# Patient Record
Sex: MALE | Race: WHITE | NOT HISPANIC OR LATINO | Employment: FULL TIME | URBAN - METROPOLITAN AREA
[De-identification: names, ages, dates, MRNs, and addresses within clinical notes are randomized per-mention and may not be internally consistent; named-entity substitution may affect disease eponyms.]

---

## 2017-11-16 ENCOUNTER — ALLSCRIPTS OFFICE VISIT (OUTPATIENT)
Dept: OTHER | Facility: OTHER | Age: 47
End: 2017-11-16

## 2017-11-16 ENCOUNTER — TRANSCRIBE ORDERS (OUTPATIENT)
Dept: LAB | Facility: CLINIC | Age: 47
End: 2017-11-16

## 2017-11-16 ENCOUNTER — GENERIC CONVERSION - ENCOUNTER (OUTPATIENT)
Dept: OTHER | Facility: OTHER | Age: 47
End: 2017-11-16

## 2017-11-16 ENCOUNTER — APPOINTMENT (OUTPATIENT)
Dept: LAB | Facility: CLINIC | Age: 47
End: 2017-11-16
Payer: COMMERCIAL

## 2017-11-16 DIAGNOSIS — Z00.00 ENCOUNTER FOR GENERAL ADULT MEDICAL EXAMINATION WITHOUT ABNORMAL FINDINGS: ICD-10-CM

## 2017-11-16 DIAGNOSIS — R19.7 POSTCHOLECYSTECTOMY DIARRHEA: ICD-10-CM

## 2017-11-16 DIAGNOSIS — G47.00 INSOMNIA: ICD-10-CM

## 2017-11-16 DIAGNOSIS — F41.9 ANXIETY DISORDER: ICD-10-CM

## 2017-11-16 DIAGNOSIS — J45.909 ASTHMATIC BRONCHITIS WITHOUT COMPLICATION, UNSPECIFIED ASTHMA SEVERITY, UNSPECIFIED WHETHER PERSISTENT: ICD-10-CM

## 2017-11-16 DIAGNOSIS — F45.8 ANXIETY HYPERVENTILATION: Primary | ICD-10-CM

## 2017-11-16 DIAGNOSIS — Z90.49 ACQUIRED ABSENCE OF OTHER SPECIFIED PARTS OF DIGESTIVE TRACT: ICD-10-CM

## 2017-11-16 DIAGNOSIS — E78.5 HYPERLIPIDEMIA: ICD-10-CM

## 2017-11-16 DIAGNOSIS — R80.9 PROTEINURIA: ICD-10-CM

## 2017-11-16 DIAGNOSIS — J45.909 UNCOMPLICATED ASTHMA: ICD-10-CM

## 2017-11-16 DIAGNOSIS — G47.00 PERSISTENT DISORDER OF INITIATING OR MAINTAINING SLEEP: ICD-10-CM

## 2017-11-16 DIAGNOSIS — R80.9 PROTEINURIA, UNSPECIFIED TYPE: ICD-10-CM

## 2017-11-16 DIAGNOSIS — E78.5 HYPERLIPIDEMIA, UNSPECIFIED HYPERLIPIDEMIA TYPE: ICD-10-CM

## 2017-11-16 DIAGNOSIS — Z90.49 POSTCHOLECYSTECTOMY DIARRHEA: ICD-10-CM

## 2017-11-16 DIAGNOSIS — F41.9 ANXIETY HYPERVENTILATION: Primary | ICD-10-CM

## 2017-11-16 LAB
ALBUMIN SERPL BCP-MCNC: 4.2 G/DL (ref 3.5–5)
ALP SERPL-CCNC: 64 U/L (ref 46–116)
ALT SERPL W P-5'-P-CCNC: 30 U/L (ref 12–78)
ANION GAP SERPL CALCULATED.3IONS-SCNC: 6 MMOL/L (ref 4–13)
AST SERPL W P-5'-P-CCNC: 21 U/L (ref 5–45)
BASOPHILS # BLD AUTO: 0.03 THOUSANDS/ΜL (ref 0–0.1)
BASOPHILS NFR BLD AUTO: 1 % (ref 0–1)
BILIRUB SERPL-MCNC: 0.78 MG/DL (ref 0.2–1)
BILIRUB UR QL STRIP: NEGATIVE
BUN SERPL-MCNC: 17 MG/DL (ref 5–25)
CALCIUM SERPL-MCNC: 9 MG/DL (ref 8.3–10.1)
CHLORIDE SERPL-SCNC: 105 MMOL/L (ref 100–108)
CHOLEST SERPL-MCNC: 185 MG/DL (ref 50–200)
CLARITY UR: CLEAR
CO2 SERPL-SCNC: 28 MMOL/L (ref 21–32)
COLOR UR: YELLOW
CREAT SERPL-MCNC: 1 MG/DL (ref 0.6–1.3)
EOSINOPHIL # BLD AUTO: 0.28 THOUSAND/ΜL (ref 0–0.61)
EOSINOPHIL NFR BLD AUTO: 5 % (ref 0–6)
ERYTHROCYTE [DISTWIDTH] IN BLOOD BY AUTOMATED COUNT: 12.3 % (ref 11.6–15.1)
EST. AVERAGE GLUCOSE BLD GHB EST-MCNC: 105 MG/DL
GFR SERPL CREATININE-BSD FRML MDRD: 89 ML/MIN/1.73SQ M
GLUCOSE P FAST SERPL-MCNC: 94 MG/DL (ref 65–99)
GLUCOSE UR STRIP-MCNC: NEGATIVE MG/DL
HBA1C MFR BLD: 5.3 % (ref 4.2–6.3)
HCT VFR BLD AUTO: 42 % (ref 36.5–49.3)
HDLC SERPL-MCNC: 54 MG/DL (ref 40–60)
HGB BLD-MCNC: 14.5 G/DL (ref 12–17)
HGB UR QL STRIP.AUTO: NEGATIVE
KETONES UR STRIP-MCNC: NEGATIVE MG/DL
LDLC SERPL CALC-MCNC: 108 MG/DL (ref 0–100)
LEUKOCYTE ESTERASE UR QL STRIP: NEGATIVE
LYMPHOCYTES # BLD AUTO: 1.94 THOUSANDS/ΜL (ref 0.6–4.47)
LYMPHOCYTES NFR BLD AUTO: 32 % (ref 14–44)
MCH RBC QN AUTO: 29.7 PG (ref 26.8–34.3)
MCHC RBC AUTO-ENTMCNC: 34.5 G/DL (ref 31.4–37.4)
MCV RBC AUTO: 86 FL (ref 82–98)
MONOCYTES # BLD AUTO: 0.53 THOUSAND/ΜL (ref 0.17–1.22)
MONOCYTES NFR BLD AUTO: 9 % (ref 4–12)
NEUTROPHILS # BLD AUTO: 3.2 THOUSANDS/ΜL (ref 1.85–7.62)
NEUTS SEG NFR BLD AUTO: 53 % (ref 43–75)
NITRITE UR QL STRIP: NEGATIVE
NRBC BLD AUTO-RTO: 0 /100 WBCS
PH UR STRIP.AUTO: 6 [PH] (ref 4.5–8)
PLATELET # BLD AUTO: 205 THOUSANDS/UL (ref 149–390)
PMV BLD AUTO: 10.5 FL (ref 8.9–12.7)
POTASSIUM SERPL-SCNC: 4.2 MMOL/L (ref 3.5–5.3)
PROT SERPL-MCNC: 7.6 G/DL (ref 6.4–8.2)
PROT UR STRIP-MCNC: NEGATIVE MG/DL
RBC # BLD AUTO: 4.88 MILLION/UL (ref 3.88–5.62)
SODIUM SERPL-SCNC: 139 MMOL/L (ref 136–145)
SP GR UR STRIP.AUTO: 1.02 (ref 1–1.03)
TRIGL SERPL-MCNC: 116 MG/DL
TSH SERPL DL<=0.05 MIU/L-ACNC: 2.23 UIU/ML (ref 0.36–3.74)
UROBILINOGEN UR QL STRIP.AUTO: 0.2 E.U./DL
WBC # BLD AUTO: 6 THOUSAND/UL (ref 4.31–10.16)

## 2017-11-16 PROCEDURE — 36415 COLL VENOUS BLD VENIPUNCTURE: CPT

## 2017-11-16 PROCEDURE — 83036 HEMOGLOBIN GLYCOSYLATED A1C: CPT

## 2017-11-16 PROCEDURE — 87086 URINE CULTURE/COLONY COUNT: CPT

## 2017-11-16 PROCEDURE — 81003 URINALYSIS AUTO W/O SCOPE: CPT

## 2017-11-16 PROCEDURE — 80053 COMPREHEN METABOLIC PANEL: CPT

## 2017-11-16 PROCEDURE — 84443 ASSAY THYROID STIM HORMONE: CPT

## 2017-11-16 PROCEDURE — 85025 COMPLETE CBC W/AUTO DIFF WBC: CPT

## 2017-11-16 PROCEDURE — 80061 LIPID PANEL: CPT

## 2017-11-17 LAB — BACTERIA UR CULT: NORMAL

## 2017-11-17 NOTE — PROGRESS NOTES
Assessment    1  Asthma (493 90) (J45 909)   2  Anxiety (300 00) (F41 9)   3  S/P colon resection (V45 89) (Z90 49)   4  Eczema (692 9) (L30 9)   5  Hyperlipidemia (272 4) (E78 5)    Plan  Anxiety, Asthma, Health Maintenance, PMH: History of insomnia, Hyperlipidemia,Proteinuria, S/P colon resection    · (1) CBC/PLT/DIFF; Status:Active; Requested for:16Nov2017;    · (1) COMPREHENSIVE METABOLIC PANEL; Status:Active; Requested for:16Nov2017;    · (1) HEMOGLOBIN A1C; Status:Active; Requested for:16Nov2017;    · (1) LIPID PANEL, FASTING; Status:Active; Requested for:16Nov2017;    · (1) TSH; Status:Active; Requested for:16Nov2017;    · (1) URINALYSIS (will reflex a microscopy if leukocytes, occult blood, protein or nitrites arenot within normal limits); Status:Active; Requested for:16Nov2017;    · (1) URINE CULTURE; Source:Urine, Clean Catch; Status:Active; Requestedfor:16Nov2017; Asthma    · Advair Diskus 250-50 MCG/DOSE Inhalation Aerosol Powder Breath Activated;USE 1 INHALATION TWO TIMES A DAY   · ProAir  (90 Base) MCG/ACT Inhalation Aerosol Solution; INHALE 1 PUFFEVERY 4 HOURS AS NEEDED  Eczema    · Mometasone Furoate 0 1 % External Cream; APPLY SPARINGLY TO AFFECTEDAREAS TWICE DAILY  (AM AND PM)  Health Maintenance    · Stop: Fluzone Quadrivalent 0 5 ML Intramuscular Suspension PrefilledSyringe    Discussion/Summary    #1  Status post colon resection for a flat polyp  The surgery was done by Dr Joy Marquez  Eczema-patient was given a prescription for Elocon cream to apply twice a day when necessary  Asthma-stable with Advair and ProAir  Anxiety-stable  Hyperlipidemia-labs are ordered today and patient will be called with those results  to keep his asthma under control and keep his cholesterol numbers down  patient with results of fasting labs which will be drawn today  Possible side effects of new medications were reviewed with the patient/guardian today  The treatment plan was reviewed with the patient/guardian   The patient/guardian understands and agrees with the treatment plan     Self Referrals: No      Chief Complaint  Follow up anxiety, asthma, hyperlipidemiaimmun[facility] declined - [facility]      History of Present Illness  This is a 50-year-old male who comes in for his yearly follow-up  His asthma is stable using Advair twice a day when he needs it and also uses ProAir also as he needs it  He had a colon resection due to a flat polyp which had to be removed by doing a colon resection and he has recovered nicely from that  His cholesterol numbers will be checked with his labs which will be done today because he is fasting  He is on simvastatin 40 mg daily  He also gets some periods of lightheadedness in between meals and his father had some problems with some diabetes in the past  His last to fasting blood glucoses were both 98  Review of Systems   Constitutional: No fever or chills, feels well, no tiredness, no recent weight gain or weight loss  ENT: no complaints of earache, no hearing loss, no nosebleeds, no nasal discharge, no sore throat, no hoarseness  Cardiovascular: No complaints of slow heart rate, no fast heart rate, no chest pain, no palpitations, no leg claudication, no lower extremity  Respiratory: No complaints of shortness of breath, no wheezing, no cough, no SOB on exertion, no orthopnea or PND  Gastrointestinal: No complaints of abdominal pain, no constipation, no nausea or vomiting, no diarrhea or bloody stools  Genitourinary: No complaints of dysuria, no incontinence, no hesitancy, no nocturia, no genital lesion, no testicular pain  ROS reviewed  Active Problems  1  Anxiety (300 00) (F41 9)   2  Asthma (493 90) (J45 909)   3  Colonoscopy (Fiberoptic) Screening   4  Encounter for prostate cancer screening (V76 44) (Z12 5)   5  Hyperlipidemia (272 4) (E78 5)   6  Proteinuria (791 0) (R80 9)   7  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)    Past Medical History  1   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)    The active problems and past medical history were reviewed and updated today  Surgical History  1  History of Primary Repair Of Knee Ligament Cruciate Anterior    The surgical history was reviewed and updated today  Family History  Mother    1  Family history of Coronary Artery Disease (V17 49)  Father    2  Family history of Acute Myocardial Infarction (V17 3)   3  Family history of Hyperlipidemia   4  Family history of Hypertension (V17 49)  Family History    5  Denied: Family history of Anxiety and depression   6  Family history of Family Health Status Of Brother - Alive   7  Family history of Family Health Status Of Father - Alive   8  Family history of Family Health Status Of Mother -     The family history was reviewed and updated today  Social History     · Being A Social Drinker   · Employed   · Never a smoker   · No secondhand smoke exposure (V49 89) (Z78 9)   · Single  The social history was reviewed and updated today  The social history was reviewed and is unchanged  Current Meds   1  Advair Diskus 250-50 MCG/DOSE Inhalation Aerosol Powder Breath Activated; INHALE 1 PUFF TWICE DAILY  RINSE MOUTH AFTER USE; Therapy: 46WTY0588 to (Last BI:[id number])  Requested for: 17VXW6964 Ordered   2  ProAir  (90 Base) MCG/ACT Inhalation Aerosol Solution; INHALE 1 PUFF EVERY 4 HOURS AS NEEDED; Therapy: 18AMV1843 to ([facility])  Requested for: 17MGE2912; Last Rx:[id number] Ordered   3  Simvastatin 40 MG Oral Tablet; Take 1 tablet daily; Therapy: 98MJL0774 to (Last Rx:57Lph0957)  Requested for: 25Phj5322 Ordered    The medication list was reviewed and updated today  Allergies  1   No Known Drug Allergies    Vitals  Vital Signs    Recorded: 42UMF0610 09:30AM   Heart Rate 62, L Radial   Pulse Quality Regular, L Radial   Systolic 96, LUE, Sitting   Diastolic 70, LUE, Sitting   BP CUFF SIZE Large   Height 5 ft 7 in   Weight 190 lb    BMI Calculated 29 76 BSA Calculated 1 98       Physical Exam   Constitutional  General appearance: No acute distress, well appearing and well nourished  Ears, Nose, Mouth, and Throat  External inspection of ears and nose: Normal    Otoscopic examination: Tympanic membrance translucent with normal light reflex  Canals patent without erythema  Oropharynx: Normal with no erythema, edema, exudate or lesions  Pulmonary  Auscultation of lungs: Clear to auscultation, equal breath sounds bilaterally, no wheezes, no rales, no rhonci  Cardiovascular  Auscultation of heart: Normal rate and rhythm, normal S1 and S2, without murmurs  Examination of extremities for edema and/or varicosities: Normal    Abdomen  Abdomen: Non-tender, no masses  Liver and spleen: No hepatomegaly or splenomegaly  Lymphatic  Palpation of lymph nodes in neck: No lymphadenopathy  Psychiatric  Orientation to person, place and time: Normal    Mood and affect: Normal          Results/Data  PHQ-9 Adult Depression Screening 50RYV2307 09:40AM User, Zonbo Medias     Test Name Result Flag Reference   PHQ-9 Adult Depression Score 5       Over the last two weeks, how often have you been bothered by any of the following problems? Little interest or pleasure in doing things: Not at all - 0 Feeling down, depressed, or hopeless: Not at all - 0 Trouble falling or staying asleep, or sleeping too much: More than half the days - 2 Feeling tired or having little energy: Several days - 1 Poor appetite or over eating: More than half the days - 2 Feeling bad about yourself - or that you are a failure or have let yourself or your family down: Not at all - 0 Trouble concentrating on things, such as reading the newspaper or watching television: Not at all - 0 Moving or speaking so slowly that other people could have noticed   Or the opposite -  being so fidgety or restless that you have been moving around a lot more than usual: Not at all - 0 Thoughts that you would be better off dead, or of hurting yourself in some way: Not at all - 0   PHQ-9 Adult Depression Screening Negative     PHQ-9 Difficulty Level Not difficult at all     PHQ-9 Severity Mild Depression           Signatures   Electronically signed by : Winston Younger [facility]; Nov 16 2017 10:07AM EST                       (Author)    Electronically signed by : Lisa Garcia DO; Nov 16 2017 10:25AM EST                       (Author)

## 2017-11-19 ENCOUNTER — GENERIC CONVERSION - ENCOUNTER (OUTPATIENT)
Dept: OTHER | Facility: OTHER | Age: 47
End: 2017-11-19

## 2017-12-29 ENCOUNTER — GENERIC CONVERSION - ENCOUNTER (OUTPATIENT)
Dept: FAMILY MEDICINE CLINIC | Facility: CLINIC | Age: 47
End: 2017-12-29

## 2018-01-11 NOTE — RESULT NOTES
Verified Results  (1) URINALYSIS w URINE C/S REFLEX (will reflex a microscopy if leukocytes, occult blood, or nitrites are not within normal limits) 21Nov2016 08:20AM Beth Sandoval Order Number: [id number]     Test Name Result Flag Reference   CLINICAL REPORT (Report)     Test:        Urine culture  Specimen Type:   Urine  Specimen Date:   11/21/2016 8:20 AM  Result Date:    11/22/2016 12:48 PM  Result Status:   Final result  Resulting Lab:   [place] [address]            Tel: [phone]      CULTURE                                       ------------------                                   No Growth <1000 cfu/mL

## 2018-01-13 VITALS
HEIGHT: 67 IN | DIASTOLIC BLOOD PRESSURE: 70 MMHG | SYSTOLIC BLOOD PRESSURE: 96 MMHG | WEIGHT: 190 LBS | BODY MASS INDEX: 29.82 KG/M2 | HEART RATE: 62 BPM

## 2018-01-16 NOTE — RESULT NOTES
Verified Results  (1) URINALYSIS w URINE C/S REFLEX (will reflex a microscopy if leukocytes, occult blood, or nitrites are not within normal limits) 21Nov2016 08:20AM Deb Car Order Number: [id number]     Test Name Result Flag Reference   COLOR Yellow     CLARITY Clear     PH UA 5 5  4 5-8 0   LEUKOCYTE ESTERASE UA Negative  Negative   NITRITE UA Negative  Negative   PROTEIN UA Trace mg/dl A Negative   GLUCOSE UA Negative mg/dl  Negative   KETONES UA Negative mg/dl  Negative   UROBILINOGEN UA 0 2 E U /dl  0 2, 1 0 E U /dl   BILIRUBIN UA Negative  Negative   BLOOD UA Trace A Negative   SPECIFIC GRAVITY UA 1 027  1 003-1 030   BACTERIA None Seen /hpf  None Seen, Occasional   EPITHELIAL CELLS None Seen /hpf  None Seen, Occasional   RBC UA 4-10 /hpf A None Seen   WBC UA None Seen /hpf  None Seen

## 2018-02-13 NOTE — RESULT NOTES
Verified Results  (1) HEMOGLOBIN A1C [id number] 10:15AM Santy Armstrong     Test Name Result Flag Reference   HEMOGLOBIN A1C 5 3 %  4 2-6 3   EST  AVG   GLUCOSE 105 mg/dl

## 2018-05-25 DIAGNOSIS — E78.2 MIXED HYPERLIPIDEMIA: Primary | ICD-10-CM

## 2018-05-25 RX ORDER — SIMVASTATIN 40 MG
TABLET ORAL
Qty: 90 TABLET | Refills: 2 | Status: SHIPPED | OUTPATIENT
Start: 2018-05-25 | End: 2019-03-03 | Stop reason: SDUPTHER

## 2019-03-03 DIAGNOSIS — E78.2 MIXED HYPERLIPIDEMIA: ICD-10-CM

## 2019-03-04 RX ORDER — SIMVASTATIN 40 MG
TABLET ORAL
Qty: 90 TABLET | Refills: 0 | Status: SHIPPED | OUTPATIENT
Start: 2019-03-04 | End: 2019-05-13 | Stop reason: SDUPTHER

## 2019-03-04 NOTE — TELEPHONE ENCOUNTER
The patient may have 90 tablets but has not been seen since November of 2017 and needs an appointment    I will send 30 tablets

## 2019-05-13 DIAGNOSIS — E78.2 MIXED HYPERLIPIDEMIA: ICD-10-CM

## 2019-05-13 RX ORDER — SIMVASTATIN 40 MG
40 TABLET ORAL DAILY
Qty: 90 TABLET | Refills: 0 | Status: SHIPPED | OUTPATIENT
Start: 2019-05-13 | End: 2019-08-28 | Stop reason: SDUPTHER

## 2019-05-13 RX ORDER — SIMVASTATIN 40 MG
TABLET ORAL
Qty: 90 TABLET | Refills: 0 | OUTPATIENT
Start: 2019-05-13

## 2019-08-28 DIAGNOSIS — E78.2 MIXED HYPERLIPIDEMIA: ICD-10-CM

## 2019-08-28 RX ORDER — SIMVASTATIN 40 MG
TABLET ORAL
Qty: 30 TABLET | Refills: 0 | Status: SHIPPED | OUTPATIENT
Start: 2019-08-28 | End: 2019-11-13 | Stop reason: SDUPTHER

## 2019-11-13 ENCOUNTER — OFFICE VISIT (OUTPATIENT)
Dept: FAMILY MEDICINE CLINIC | Facility: CLINIC | Age: 49
End: 2019-11-13
Payer: COMMERCIAL

## 2019-11-13 ENCOUNTER — APPOINTMENT (OUTPATIENT)
Dept: LAB | Facility: CLINIC | Age: 49
End: 2019-11-13
Payer: COMMERCIAL

## 2019-11-13 VITALS
BODY MASS INDEX: 29.98 KG/M2 | HEART RATE: 64 BPM | HEIGHT: 67 IN | SYSTOLIC BLOOD PRESSURE: 118 MMHG | WEIGHT: 191 LBS | DIASTOLIC BLOOD PRESSURE: 80 MMHG

## 2019-11-13 DIAGNOSIS — F41.9 ANXIETY: ICD-10-CM

## 2019-11-13 DIAGNOSIS — J45.990 EXERCISE-INDUCED ASTHMA: ICD-10-CM

## 2019-11-13 DIAGNOSIS — E78.2 MIXED HYPERLIPIDEMIA: ICD-10-CM

## 2019-11-13 DIAGNOSIS — J45.990 EXERCISE-INDUCED ASTHMA: Primary | ICD-10-CM

## 2019-11-13 DIAGNOSIS — Z12.5 SCREENING FOR PROSTATE CANCER: ICD-10-CM

## 2019-11-13 PROBLEM — M25.569 KNEE PAIN: Status: ACTIVE | Noted: 2019-11-13

## 2019-11-13 PROBLEM — M22.40 CHONDROMALACIA PATELLAE: Status: ACTIVE | Noted: 2019-11-13

## 2019-11-13 PROBLEM — Z98.890 S/P COLONOSCOPY: Status: ACTIVE | Noted: 2017-08-03

## 2019-11-13 PROBLEM — M17.10 OSTEOARTHRITIS OF KNEE: Status: ACTIVE | Noted: 2019-11-13

## 2019-11-13 PROBLEM — M25.469 KNEE JOINT EFFUSION: Status: ACTIVE | Noted: 2019-11-13

## 2019-11-13 PROBLEM — M76.30 ILIOTIBIAL BAND SYNDROME: Status: ACTIVE | Noted: 2019-11-13

## 2019-11-13 PROBLEM — L30.9 ECZEMA: Status: ACTIVE | Noted: 2017-11-16

## 2019-11-13 PROBLEM — IMO0002 CURRENT TEAR OF MEDIAL CARTILAGE OR MENISCUS OF KNEE: Status: ACTIVE | Noted: 2019-11-13

## 2019-11-13 PROBLEM — M17.9 OSTEOARTHRITIS OF KNEE: Status: ACTIVE | Noted: 2019-11-13

## 2019-11-13 PROBLEM — S83.289A TEAR OF LATERAL CARTILAGE OR MENISCUS OF KNEE, CURRENT: Status: ACTIVE | Noted: 2019-11-13

## 2019-11-13 PROBLEM — S46.319A RUPTURE OF TRICEPS TENDON: Status: ACTIVE | Noted: 2019-11-13

## 2019-11-13 PROBLEM — K63.89 COLONIC MASS: Status: ACTIVE | Noted: 2017-01-17

## 2019-11-13 LAB
ALBUMIN SERPL BCP-MCNC: 4.5 G/DL (ref 3.5–5)
ALP SERPL-CCNC: 67 U/L (ref 46–116)
ALT SERPL W P-5'-P-CCNC: 31 U/L (ref 12–78)
ANION GAP SERPL CALCULATED.3IONS-SCNC: 6 MMOL/L (ref 4–13)
AST SERPL W P-5'-P-CCNC: 19 U/L (ref 5–45)
BASOPHILS # BLD AUTO: 0.06 THOUSANDS/ΜL (ref 0–0.1)
BASOPHILS NFR BLD AUTO: 1 % (ref 0–1)
BILIRUB SERPL-MCNC: 0.62 MG/DL (ref 0.2–1)
BUN SERPL-MCNC: 17 MG/DL (ref 5–25)
CALCIUM SERPL-MCNC: 9 MG/DL (ref 8.3–10.1)
CHLORIDE SERPL-SCNC: 107 MMOL/L (ref 100–108)
CHOLEST SERPL-MCNC: 189 MG/DL (ref 50–200)
CO2 SERPL-SCNC: 28 MMOL/L (ref 21–32)
CREAT SERPL-MCNC: 1.04 MG/DL (ref 0.6–1.3)
EOSINOPHIL # BLD AUTO: 0.37 THOUSAND/ΜL (ref 0–0.61)
EOSINOPHIL NFR BLD AUTO: 6 % (ref 0–6)
ERYTHROCYTE [DISTWIDTH] IN BLOOD BY AUTOMATED COUNT: 12.3 % (ref 11.6–15.1)
EST. AVERAGE GLUCOSE BLD GHB EST-MCNC: 103 MG/DL
GFR SERPL CREATININE-BSD FRML MDRD: 83 ML/MIN/1.73SQ M
GLUCOSE P FAST SERPL-MCNC: 112 MG/DL (ref 65–99)
HBA1C MFR BLD: 5.2 % (ref 4.2–6.3)
HCT VFR BLD AUTO: 46.5 % (ref 36.5–49.3)
HDLC SERPL-MCNC: 60 MG/DL
HGB BLD-MCNC: 15.2 G/DL (ref 12–17)
IMM GRANULOCYTES # BLD AUTO: 0.02 THOUSAND/UL (ref 0–0.2)
IMM GRANULOCYTES NFR BLD AUTO: 0 % (ref 0–2)
LDLC SERPL CALC-MCNC: 109 MG/DL (ref 0–100)
LYMPHOCYTES # BLD AUTO: 1.43 THOUSANDS/ΜL (ref 0.6–4.47)
LYMPHOCYTES NFR BLD AUTO: 24 % (ref 14–44)
MCH RBC QN AUTO: 29.4 PG (ref 26.8–34.3)
MCHC RBC AUTO-ENTMCNC: 32.7 G/DL (ref 31.4–37.4)
MCV RBC AUTO: 90 FL (ref 82–98)
MONOCYTES # BLD AUTO: 0.46 THOUSAND/ΜL (ref 0.17–1.22)
MONOCYTES NFR BLD AUTO: 8 % (ref 4–12)
NEUTROPHILS # BLD AUTO: 3.74 THOUSANDS/ΜL (ref 1.85–7.62)
NEUTS SEG NFR BLD AUTO: 61 % (ref 43–75)
NRBC BLD AUTO-RTO: 0 /100 WBCS
PLATELET # BLD AUTO: 227 THOUSANDS/UL (ref 149–390)
PMV BLD AUTO: 10.1 FL (ref 8.9–12.7)
POTASSIUM SERPL-SCNC: 4.8 MMOL/L (ref 3.5–5.3)
PROT SERPL-MCNC: 7.9 G/DL (ref 6.4–8.2)
PSA SERPL-MCNC: 1.2 NG/ML (ref 0–4)
RBC # BLD AUTO: 5.17 MILLION/UL (ref 3.88–5.62)
SODIUM SERPL-SCNC: 141 MMOL/L (ref 136–145)
TRIGL SERPL-MCNC: 98 MG/DL
TSH SERPL DL<=0.05 MIU/L-ACNC: 1.74 UIU/ML (ref 0.36–3.74)
WBC # BLD AUTO: 6.08 THOUSAND/UL (ref 4.31–10.16)

## 2019-11-13 PROCEDURE — 36415 COLL VENOUS BLD VENIPUNCTURE: CPT

## 2019-11-13 PROCEDURE — 84443 ASSAY THYROID STIM HORMONE: CPT

## 2019-11-13 PROCEDURE — 99214 OFFICE O/P EST MOD 30 MIN: CPT | Performed by: FAMILY MEDICINE

## 2019-11-13 PROCEDURE — 83036 HEMOGLOBIN GLYCOSYLATED A1C: CPT

## 2019-11-13 PROCEDURE — 80053 COMPREHEN METABOLIC PANEL: CPT

## 2019-11-13 PROCEDURE — 1036F TOBACCO NON-USER: CPT | Performed by: FAMILY MEDICINE

## 2019-11-13 PROCEDURE — G0103 PSA SCREENING: HCPCS

## 2019-11-13 PROCEDURE — 85025 COMPLETE CBC W/AUTO DIFF WBC: CPT

## 2019-11-13 PROCEDURE — 80061 LIPID PANEL: CPT

## 2019-11-13 RX ORDER — ALBUTEROL SULFATE 90 UG/1
AEROSOL, METERED RESPIRATORY (INHALATION)
COMMUNITY
Start: 2012-01-10 | End: 2019-11-13 | Stop reason: SDUPTHER

## 2019-11-13 RX ORDER — SIMVASTATIN 40 MG
40 TABLET ORAL DAILY
Qty: 90 TABLET | Refills: 3 | Status: SHIPPED | OUTPATIENT
Start: 2019-11-13 | End: 2021-01-06

## 2019-11-13 RX ORDER — DIPHENOXYLATE HYDROCHLORIDE AND ATROPINE SULFATE 2.5; .025 MG/1; MG/1
1 TABLET ORAL DAILY
COMMUNITY

## 2019-11-13 NOTE — ASSESSMENT & PLAN NOTE
His last LDL was slightly elevated and he is on simvastatin 40 mg daily  Labs are ordered and he will be called with those results

## 2019-11-13 NOTE — PATIENT INSTRUCTIONS

## 2019-11-13 NOTE — PROGRESS NOTES
Assessment and Plan:  The patient will be called with the results of the labs and if the labs are within normal limits he will be seen in 1 year  Problem List Items Addressed This Visit        Respiratory    Exercise-induced asthma - Primary     He needs a refill of his ProAir inhaler which he uses 2 puffs 4 times a day as needed  Relevant Medications    Albuterol Sulfate (PROAIR RESPICLICK) 438 (90 Base) MCG/ACT AEPB    Other Relevant Orders    CBC and differential    Comprehensive metabolic panel    Hemoglobin A1C    Lipid Panel with Direct LDL reflex    TSH, 3rd generation       Other    Anxiety     His anxiety is stable on no medication  Relevant Medications    Albuterol Sulfate (PROAIR RESPICLICK) 925 (90 Base) MCG/ACT AEPB    Other Relevant Orders    CBC and differential    Comprehensive metabolic panel    Hemoglobin A1C    Lipid Panel with Direct LDL reflex    TSH, 3rd generation    Mixed hyperlipidemia     His last LDL was slightly elevated and he is on simvastatin 40 mg daily  Labs are ordered and he will be called with those results  Relevant Medications    Albuterol Sulfate (PROAIR RESPICLICK) 415 (90 Base) MCG/ACT AEPB    simvastatin (ZOCOR) 40 mg tablet    Other Relevant Orders    CBC and differential    Comprehensive metabolic panel    Hemoglobin A1C    Lipid Panel with Direct LDL reflex    TSH, 3rd generation    BMI 30 0-30 9,adult     His weight is 191 lb up 1 lb from the previous visit and his BMI is 30 3  Relevant Medications    Albuterol Sulfate (PROAIR RESPICLICK) 565 (90 Base) MCG/ACT AEPB    Other Relevant Orders    CBC and differential    Comprehensive metabolic panel    Hemoglobin A1C    Lipid Panel with Direct LDL reflex    TSH, 3rd generation    Screening for prostate cancer     A PSA is ordered with his labs for next time           Relevant Orders    PSA, Total Screen                 Diagnoses and all orders for this visit:    Exercise-induced asthma  - CBC and differential; Future  -     Comprehensive metabolic panel; Future  -     Hemoglobin A1C; Future  -     Lipid Panel with Direct LDL reflex; Future  -     TSH, 3rd generation; Future  -     Albuterol Sulfate (PROAIR RESPICLICK) 138 (90 Base) MCG/ACT AEPB; Take 2 puffs by mouth 4 (four) times a day as needed (wheezing)    Mixed hyperlipidemia  -     CBC and differential; Future  -     Comprehensive metabolic panel; Future  -     Hemoglobin A1C; Future  -     Lipid Panel with Direct LDL reflex; Future  -     TSH, 3rd generation; Future  -     Albuterol Sulfate (PROAIR RESPICLICK) 790 (90 Base) MCG/ACT AEPB; Take 2 puffs by mouth 4 (four) times a day as needed (wheezing)  -     simvastatin (ZOCOR) 40 mg tablet; Take 1 tablet (40 mg total) by mouth daily    Anxiety  -     CBC and differential; Future  -     Comprehensive metabolic panel; Future  -     Hemoglobin A1C; Future  -     Lipid Panel with Direct LDL reflex; Future  -     TSH, 3rd generation; Future  -     Albuterol Sulfate (PROAIR RESPICLICK) 147 (90 Base) MCG/ACT AEPB; Take 2 puffs by mouth 4 (four) times a day as needed (wheezing)    BMI 30 0-30 9,adult  -     CBC and differential; Future  -     Comprehensive metabolic panel; Future  -     Hemoglobin A1C; Future  -     Lipid Panel with Direct LDL reflex; Future  -     TSH, 3rd generation; Future  -     Albuterol Sulfate (PROAIR RESPICLICK) 130 (90 Base) MCG/ACT AEPB; Take 2 puffs by mouth 4 (four) times a day as needed (wheezing)    Screening for prostate cancer  -     PSA, Total Screen; Future    Other orders  -     Discontinue: albuterol (PROAIR HFA) 90 mcg/act inhaler; ProAir HFA 90 mcg/actuation aerosol inhaler   INHALE 1 PUFF EVERY 4 HOURS AS NEEDED  -     multivitamin (THERAGRAN) TABS; Take 1 tablet by mouth daily              Subjective:      Patient ID: Brenda Melgar is a 47 y o  male  CC:    Chief Complaint   Patient presents with    Hyperlipidemia     Follow up    Pt states he has been doing well, but has not had BW recently  Flu immunization declined  -  [facility]       HPI:    This is a 69-year-old male who comes in for his refills of his medication  He has not been seen since 2017 and labs will be drawn and he will be called with those results  He is not having any problems at the present time except for his girlfriend having a cat which makes him tight and sets off some allergy symptoms  He needs a refill of his inhaler and his simvastatin  His blood pressure is 118/80 and his weight is up 1 lb from the previous visit to 191 lb and his BMI is 30 3  The following portions of the patient's history were reviewed and updated as appropriate: allergies, current medications, past family history, past medical history, past social history, past surgical history and problem list       Review of Systems   Constitutional: Negative  HENT: Negative  Eyes: Negative  Respiratory: Negative  Cardiovascular: Negative  Gastrointestinal: Negative  Endocrine: Negative  Genitourinary: Negative  Musculoskeletal: Negative  Skin: Negative  Allergic/Immunologic: Negative  Neurological: Negative  Hematological: Negative  Psychiatric/Behavioral: Negative  Data to review:       Objective:    Vitals:    11/13/19 0847   BP: 118/80   BP Location: Left arm   Patient Position: Sitting   Cuff Size: Large   Pulse: 64   Weight: 86 6 kg (191 lb)   Height: 5' 6 5" (1 689 m)        Physical Exam   Constitutional: He is oriented to person, place, and time  He appears well-developed and well-nourished  HENT:   Head: Normocephalic  Right Ear: External ear normal    Left Ear: External ear normal    Mouth/Throat: Oropharynx is clear and moist    Eyes: Pupils are equal, round, and reactive to light  Conjunctivae and EOM are normal    Neck: Normal range of motion  Neck supple  Cardiovascular: Normal rate, regular rhythm and normal heart sounds     Pulmonary/Chest: Effort normal and breath sounds normal    Abdominal: Soft  Bowel sounds are normal    Musculoskeletal: Normal range of motion  Neurological: He is alert and oriented to person, place, and time  Skin: Skin is warm  Psychiatric: He has a normal mood and affect  His behavior is normal  Judgment and thought content normal    Nursing note and vitals reviewed  BMI Counseling: Body mass index is 30 37 kg/m²  The BMI is above normal  Nutrition recommendations include reducing portion sizes, decreasing overall calorie intake, 3-5 servings of fruits/vegetables daily, reducing fast food intake, consuming healthier snacks, decreasing soda and/or juice intake, moderation in carbohydrate intake, increasing intake of lean protein and reducing intake of cholesterol  Exercise recommendations include moderate aerobic physical activity for 150 minutes/week

## 2019-11-14 ENCOUNTER — TELEPHONE (OUTPATIENT)
Dept: FAMILY MEDICINE CLINIC | Facility: CLINIC | Age: 49
End: 2019-11-14

## 2019-11-14 NOTE — TELEPHONE ENCOUNTER
Patient called back for his results no MA was available  I read  To him what Margo Cardenas wrote and he did not have any questions

## 2019-11-25 DIAGNOSIS — J45.990 EXERCISE-INDUCED ASTHMA: Primary | ICD-10-CM

## 2019-11-25 NOTE — TELEPHONE ENCOUNTER
PT CALLED IN BECAUSE HE WAS WONDERING IF HE CAN BE GIVEN A MAINTENANCE INHALER LIKE ADVAIR BECAUSE HE HAS A CAT AND HE BELIEVES THE INHALER HE IS ON IS NOT REALLY   HE WILL LIKE A CALL BACK    Rc Barker

## 2020-06-22 DIAGNOSIS — J45.990 EXERCISE-INDUCED ASTHMA: Primary | ICD-10-CM

## 2020-06-22 RX ORDER — ALBUTEROL SULFATE 90 UG/1
2 AEROSOL, METERED RESPIRATORY (INHALATION) EVERY 6 HOURS PRN
Qty: 3 INHALER | Refills: 0 | Status: SHIPPED | OUTPATIENT
Start: 2020-06-22 | End: 2020-12-11 | Stop reason: SDUPTHER

## 2020-11-06 ENCOUNTER — ANESTHESIA EVENT (OUTPATIENT)
Dept: GASTROENTEROLOGY | Facility: HOSPITAL | Age: 50
End: 2020-11-06

## 2020-11-06 ENCOUNTER — ANESTHESIA (OUTPATIENT)
Dept: GASTROENTEROLOGY | Facility: HOSPITAL | Age: 50
End: 2020-11-06

## 2020-11-06 ENCOUNTER — HOSPITAL ENCOUNTER (OUTPATIENT)
Dept: GASTROENTEROLOGY | Facility: HOSPITAL | Age: 50
Setting detail: OUTPATIENT SURGERY
Discharge: HOME | End: 2020-11-06
Attending: COLON & RECTAL SURGERY | Admitting: COLON & RECTAL SURGERY
Payer: COMMERCIAL

## 2020-11-06 VITALS
SYSTOLIC BLOOD PRESSURE: 119 MMHG | HEIGHT: 67 IN | RESPIRATION RATE: 20 BRPM | DIASTOLIC BLOOD PRESSURE: 87 MMHG | WEIGHT: 191 LBS | OXYGEN SATURATION: 97 % | HEART RATE: 80 BPM | BODY MASS INDEX: 29.98 KG/M2 | TEMPERATURE: 98.4 F

## 2020-11-06 VITALS — HEART RATE: 75 BPM

## 2020-11-06 DIAGNOSIS — Z12.11 ENCOUNTER FOR SCREENING FOR MALIGNANT NEOPLASM OF COLON: ICD-10-CM

## 2020-11-06 DIAGNOSIS — Z86.010 PERSONAL HISTORY OF COLONIC POLYPS: ICD-10-CM

## 2020-11-06 PROCEDURE — 88305 TISSUE EXAM BY PATHOLOGIST: CPT | Performed by: PATHOLOGY

## 2020-11-06 RX ORDER — SODIUM CHLORIDE 9 MG/ML
125 INJECTION, SOLUTION INTRAVENOUS CONTINUOUS
Status: DISCONTINUED | OUTPATIENT
Start: 2020-11-06 | End: 2020-11-10 | Stop reason: HOSPADM

## 2020-11-06 RX ORDER — LIDOCAINE HYDROCHLORIDE 10 MG/ML
INJECTION, SOLUTION EPIDURAL; INFILTRATION; INTRACAUDAL; PERINEURAL AS NEEDED
Status: DISCONTINUED | OUTPATIENT
Start: 2020-11-06 | End: 2020-11-06

## 2020-11-06 RX ORDER — PROPOFOL 10 MG/ML
INJECTION, EMULSION INTRAVENOUS AS NEEDED
Status: DISCONTINUED | OUTPATIENT
Start: 2020-11-06 | End: 2020-11-06

## 2020-11-06 RX ADMIN — SODIUM CHLORIDE 125 ML/HR: 0.9 INJECTION, SOLUTION INTRAVENOUS at 10:00

## 2020-11-06 RX ADMIN — LIDOCAINE HYDROCHLORIDE 50 MG: 10 INJECTION, SOLUTION EPIDURAL; INFILTRATION; INTRACAUDAL; PERINEURAL at 11:12

## 2020-11-06 RX ADMIN — PROPOFOL 100 MG: 10 INJECTION, EMULSION INTRAVENOUS at 11:12

## 2020-11-06 RX ADMIN — PROPOFOL 50 MG: 10 INJECTION, EMULSION INTRAVENOUS at 11:21

## 2020-11-06 RX ADMIN — PROPOFOL 50 MG: 10 INJECTION, EMULSION INTRAVENOUS at 11:17

## 2020-11-12 DIAGNOSIS — J45.990 EXERCISE-INDUCED ASTHMA: ICD-10-CM

## 2020-12-11 DIAGNOSIS — J45.990 EXERCISE-INDUCED ASTHMA: ICD-10-CM

## 2020-12-11 RX ORDER — ALBUTEROL SULFATE 90 UG/1
2 AEROSOL, METERED RESPIRATORY (INHALATION) EVERY 6 HOURS PRN
Qty: 3 INHALER | Refills: 0 | Status: SHIPPED | OUTPATIENT
Start: 2020-12-11 | End: 2021-07-27

## 2020-12-12 RX ORDER — ALBUTEROL SULFATE 90 UG/1
2 AEROSOL, METERED RESPIRATORY (INHALATION) EVERY 6 HOURS PRN
Qty: 1 INHALER | Refills: 0 | OUTPATIENT
Start: 2020-12-12

## 2021-01-06 DIAGNOSIS — E78.2 MIXED HYPERLIPIDEMIA: ICD-10-CM

## 2021-01-06 RX ORDER — SIMVASTATIN 40 MG
TABLET ORAL
Qty: 90 TABLET | Refills: 3 | Status: SHIPPED | OUTPATIENT
Start: 2021-01-06 | End: 2022-02-08

## 2021-01-14 ENCOUNTER — OFFICE VISIT (OUTPATIENT)
Dept: FAMILY MEDICINE CLINIC | Facility: CLINIC | Age: 51
End: 2021-01-14
Payer: COMMERCIAL

## 2021-01-14 VITALS
HEIGHT: 67 IN | TEMPERATURE: 97.6 F | HEART RATE: 72 BPM | DIASTOLIC BLOOD PRESSURE: 72 MMHG | SYSTOLIC BLOOD PRESSURE: 100 MMHG | WEIGHT: 199 LBS | BODY MASS INDEX: 31.23 KG/M2

## 2021-01-14 DIAGNOSIS — E78.2 MIXED HYPERLIPIDEMIA: ICD-10-CM

## 2021-01-14 DIAGNOSIS — J45.990 EXERCISE-INDUCED ASTHMA: ICD-10-CM

## 2021-01-14 DIAGNOSIS — R06.02 SHORTNESS OF BREATH: ICD-10-CM

## 2021-01-14 DIAGNOSIS — J45.990 EXERCISE-INDUCED ASTHMA: Primary | ICD-10-CM

## 2021-01-14 DIAGNOSIS — Z12.5 SCREENING FOR MALIGNANT NEOPLASM OF PROSTATE: ICD-10-CM

## 2021-01-14 DIAGNOSIS — Z00.00 ROUTINE GENERAL MEDICAL EXAMINATION AT A HEALTH CARE FACILITY: ICD-10-CM

## 2021-01-14 PROCEDURE — 1036F TOBACCO NON-USER: CPT | Performed by: PHYSICIAN ASSISTANT

## 2021-01-14 PROCEDURE — 3725F SCREEN DEPRESSION PERFORMED: CPT | Performed by: PHYSICIAN ASSISTANT

## 2021-01-14 PROCEDURE — 99214 OFFICE O/P EST MOD 30 MIN: CPT | Performed by: PHYSICIAN ASSISTANT

## 2021-01-14 PROCEDURE — 93000 ELECTROCARDIOGRAM COMPLETE: CPT | Performed by: PHYSICIAN ASSISTANT

## 2021-01-14 PROCEDURE — 3008F BODY MASS INDEX DOCD: CPT | Performed by: PHYSICIAN ASSISTANT

## 2021-01-14 RX ORDER — FLUTICASONE PROPIONATE AND SALMETEROL XINAFOATE 45; 21 UG/1; UG/1
AEROSOL, METERED RESPIRATORY (INHALATION)
Qty: 1 INHALER | Refills: 3 | Status: SHIPPED | OUTPATIENT
Start: 2021-01-14 | End: 2021-01-18 | Stop reason: SDUPTHER

## 2021-01-14 RX ORDER — FLUTICASONE PROPIONATE AND SALMETEROL 232; 14 UG/1; UG/1
1 POWDER, METERED RESPIRATORY (INHALATION) 2 TIMES DAILY
Qty: 1 INHALER | Refills: 5 | Status: SHIPPED | OUTPATIENT
Start: 2021-01-14 | End: 2021-01-14

## 2021-01-14 NOTE — PATIENT INSTRUCTIONS
Assessment/plan:  1  Asthma-this has become more moderate persistent asthma over time  We discussed treatment options  His peak flow is 470/620 on 2 occasions in the office  I would recommend increasing his Advair dosage however he does have some concerned with the cost of it  Therefore we will switch to the higher dose of AirDuo inhaler for maintenance therapy twice daily  He will continue ProAir HFA as needed for exacerbation  If his symptoms are not improving in the next 2 weeks recommend additional medication and possibly consult with Pulmonary Medicine  EKG was performed in the office as well and within normal limits  2  Hyperlipidemia-presently stable on statin therapy, no medication changes  3  BMI of 31-continue healthy diet and exercise for this

## 2021-01-14 NOTE — PROGRESS NOTES
Assessment and Plan:  Patient Instructions   Assessment/plan:  1  Asthma-this has become more moderate persistent asthma over time  We discussed treatment options  His peak flow is 470/620 on 2 occasions in the office  I would recommend increasing his Advair dosage however he does have some concerned with the cost of it  Therefore we will switch to the higher dose of AirDuo inhaler for maintenance therapy twice daily  He will continue ProAir HFA as needed for exacerbation  If his symptoms are not improving in the next 2 weeks recommend additional medication and possibly consult with Pulmonary Medicine  EKG was performed in the office as well and within normal limits  2  Hyperlipidemia-presently stable on statin therapy, no medication changes  3  BMI of 31-continue healthy diet and exercise for this          Problem List Items Addressed This Visit        Respiratory    Exercise-induced asthma - Primary    Relevant Medications    fluticasone-salmeterol (AIRDUO RESPICLICK) 260-74 mcg/act dry powder inhaler    Other Relevant Orders    CBC and differential    Comprehensive metabolic panel    Lipid Panel with Direct LDL reflex    PSA, Total Screen    TSH, 3rd generation with Free T4 reflex    Hemoglobin A1C       Other    Mixed hyperlipidemia    Relevant Orders    CBC and differential    Comprehensive metabolic panel    Lipid Panel with Direct LDL reflex    PSA, Total Screen    TSH, 3rd generation with Free T4 reflex    Hemoglobin A1C    BMI 30 0-30 9,adult    Relevant Orders    Hemoglobin A1C      Other Visit Diagnoses     Screening for malignant neoplasm of prostate        Relevant Orders    PSA, Total Screen    Shortness of breath        Relevant Orders    POCT ECG (Completed)                 Diagnoses and all orders for this visit:    Exercise-induced asthma  -     CBC and differential  -     Comprehensive metabolic panel  -     Lipid Panel with Direct LDL reflex  -     PSA, Total Screen  -     TSH, 3rd generation with Free T4 reflex  -     Hemoglobin A1C  -     fluticasone-salmeterol (AIRDUO RESPICLICK) 171-09 mcg/act dry powder inhaler; Inhale 1 puff 2 (two) times a day Rinse mouth after use  Mixed hyperlipidemia  -     CBC and differential  -     Comprehensive metabolic panel  -     Lipid Panel with Direct LDL reflex  -     PSA, Total Screen  -     TSH, 3rd generation with Free T4 reflex  -     Hemoglobin A1C    Screening for malignant neoplasm of prostate  -     PSA, Total Screen    BMI 30 0-30 9,adult  -     Hemoglobin A1C    Shortness of breath  -     POCT ECG    Other orders  -     Probiotic Product (PROBIOTIC-10 PO); Take by mouth              Subjective:      Patient ID: Balta Chow is a 45 y o  male  CC:    Chief Complaint   Patient presents with    Anxiety     Pt notes increased episodes of SOB, and his medications are not as effective as in the past   Worsening symptoms started in the fall,  Flu and pneumonia immunizations declined  -  [facility]       HPI:    HPI:  This is a 50-year-old gentleman that presents to the office with concerns over increasing shortness of breath over the past few months during the fall  He does have a history of asthma which has been mostly mild exercise-induced asthma over the years  He states that earlier in the year when he moved in with his girlfriend and she had cats that it seemed to trigger his symptoms  Then he was on Advair and it seemed to work just fine  He had no problems as long as he would use the inhalers  Now feels that even despite the inhalers they do not seem to be really helpful  He feels like he is on the verge of an asthma attack but never has a significant problem with it  He is able to hear some audible wheezing however at times  He continues to exercise and do his regular routine  He has not had any chest pain  He does have some family history of cardiac disease however    He does have a personal history of hyperlipidemia and continues on statin therapy  The following portions of the patient's history were reviewed and updated as appropriate: allergies, current medications, past family history, past medical history, past social history, past surgical history and problem list       Review of Systems   Constitutional: Negative for fever  HENT: Negative for congestion  Respiratory: Positive for shortness of breath and wheezing  Negative for cough and chest tightness  Cardiovascular: Negative for chest pain  Musculoskeletal: Negative for arthralgias  Data to review:       Objective:    Vitals:    01/14/21 0815   BP: 100/72   BP Location: Left arm   Patient Position: Sitting   Cuff Size: Large   Pulse: 72   Temp: 97 6 °F (36 4 °C)   TempSrc: Temporal   Weight: 90 3 kg (199 lb)   Height: 5' 7" (1 702 m)        Physical Exam  Constitutional:       General: He is not in acute distress  Appearance: He is well-developed  HENT:      Head: Normocephalic and atraumatic  Right Ear: Tympanic membrane normal       Left Ear: Tympanic membrane normal    Eyes:      Conjunctiva/sclera: Conjunctivae normal    Neck:      Musculoskeletal: Normal range of motion  Cardiovascular:      Rate and Rhythm: Normal rate and regular rhythm  Pulmonary:      Effort: Pulmonary effort is normal    Abdominal:      General: Abdomen is flat  Bowel sounds are normal  There is no distension  Palpations: Abdomen is soft  There is no mass  Musculoskeletal: Normal range of motion  Skin:     General: Skin is warm  Findings: No rash  Neurological:      Mental Status: He is alert and oriented to person, place, and time  Psychiatric:         Mood and Affect: Mood normal            BMI Counseling: Body mass index is 31 17 kg/m²  The BMI is above normal  Nutrition recommendations include decreasing portion sizes  Exercise recommendations include exercising 3-5 times per week  BMI Counseling: Body mass index is 31 17 kg/m²   The BMI is above normal  Nutrition recommendations include reducing portion sizes

## 2021-01-18 ENCOUNTER — TELEPHONE (OUTPATIENT)
Dept: FAMILY MEDICINE CLINIC | Facility: CLINIC | Age: 51
End: 2021-01-18

## 2021-01-18 DIAGNOSIS — J45.990 EXERCISE-INDUCED ASTHMA: ICD-10-CM

## 2021-01-18 RX ORDER — FLUTICASONE PROPIONATE AND SALMETEROL XINAFOATE 45; 21 UG/1; UG/1
2 AEROSOL, METERED RESPIRATORY (INHALATION) 2 TIMES DAILY
Qty: 1 INHALER | Refills: 3 | Status: SHIPPED | OUTPATIENT
Start: 2021-01-18

## 2021-01-29 ENCOUNTER — TELEPHONE (OUTPATIENT)
Dept: FAMILY MEDICINE CLINIC | Facility: CLINIC | Age: 51
End: 2021-01-29

## 2021-01-29 DIAGNOSIS — J45.41 MODERATE PERSISTENT ASTHMA WITH ACUTE EXACERBATION: Primary | ICD-10-CM

## 2021-01-29 RX ORDER — PREDNISONE 10 MG/1
TABLET ORAL
Qty: 42 TABLET | Refills: 0 | Status: SHIPPED | OUTPATIENT
Start: 2021-01-29 | End: 2021-02-10

## 2021-01-29 NOTE — TELEPHONE ENCOUNTER
PATIENT CALLED IN STATES HE WAS IN FOR ASTHMA ISSUES WITH MAT & SON HAS THE SAME ASTHMA ISSUES SON  SAW HIS DOCTOR & WAS PRESCRIBED PRENISONE PT WAS WONDERING IF HE SHOULD ALSO BE ON SOME TYPE OF LOW DOSE SINCE PREDNISONE HELPED SONS ASTHMA WELL & WANTED TO KNOW IF SOMETHING LIKE THIS WOULD HELP BENEFIT HIM AS WELL   PLEASE CALL PATIENT BACK [phone] TO FOLLOW UP

## 2021-01-29 NOTE — TELEPHONE ENCOUNTER
If he is still having significant symptoms since our visit I would recommend a short course of prednisone but I would not want him to take it long-term since there can be many side effects  I will send in a 12 day taper

## 2021-05-03 ENCOUNTER — IMMUNIZATIONS (OUTPATIENT)
Dept: FAMILY MEDICINE CLINIC | Facility: HOSPITAL | Age: 51
End: 2021-05-03

## 2021-05-03 DIAGNOSIS — Z23 ENCOUNTER FOR IMMUNIZATION: Primary | ICD-10-CM

## 2021-05-03 PROCEDURE — 0011A SARS-COV-2 / COVID-19 MRNA VACCINE (MODERNA) 100 MCG: CPT

## 2021-05-03 PROCEDURE — 91301 SARS-COV-2 / COVID-19 MRNA VACCINE (MODERNA) 100 MCG: CPT

## 2021-06-07 ENCOUNTER — IMMUNIZATIONS (OUTPATIENT)
Dept: FAMILY MEDICINE CLINIC | Facility: HOSPITAL | Age: 51
End: 2021-06-07

## 2021-06-07 DIAGNOSIS — Z23 ENCOUNTER FOR IMMUNIZATION: Primary | ICD-10-CM

## 2021-06-07 PROCEDURE — 0012A SARS-COV-2 / COVID-19 MRNA VACCINE (MODERNA) 100 MCG: CPT

## 2021-06-07 PROCEDURE — 91301 SARS-COV-2 / COVID-19 MRNA VACCINE (MODERNA) 100 MCG: CPT

## 2021-07-27 DIAGNOSIS — J45.990 EXERCISE-INDUCED ASTHMA: ICD-10-CM

## 2021-07-27 RX ORDER — ALBUTEROL SULFATE 90 UG/1
AEROSOL, METERED RESPIRATORY (INHALATION)
Qty: 25.5 G | Refills: 3 | Status: SHIPPED | OUTPATIENT
Start: 2021-07-27

## 2022-02-01 ENCOUNTER — RA CDI HCC (OUTPATIENT)
Dept: OTHER | Facility: HOSPITAL | Age: 52
End: 2022-02-01

## 2022-02-01 NOTE — PROGRESS NOTES
[facility]  coding opportunities       Chart reviewed, no opportunity found: CHART REVIEWED, NO OPPORTUNITY FOUND                        Patients insurance company: [employer] (Medicare Advantage and Commercial)

## 2022-02-02 ENCOUNTER — TELEPHONE (OUTPATIENT)
Dept: OTHER | Facility: OTHER | Age: 52
End: 2022-02-02

## 2022-02-02 NOTE — TELEPHONE ENCOUNTER
Patient called to cancel his 2/3/2022 appointment  He will reschedule via [facility]

## 2022-02-03 NOTE — TELEPHONE ENCOUNTER
Patient rescheduled his canclled appointment via [address] [place] [address] [id number]  New Appointment is 03/04/2022   Thank you

## 2022-02-08 DIAGNOSIS — E78.2 MIXED HYPERLIPIDEMIA: ICD-10-CM

## 2022-02-08 RX ORDER — SIMVASTATIN 40 MG
TABLET ORAL
Qty: 90 TABLET | Refills: 3 | Status: SHIPPED | OUTPATIENT
Start: 2022-02-08

## 2022-02-09 ENCOUNTER — TELEPHONE (OUTPATIENT)
Dept: FAMILY MEDICINE CLINIC | Facility: CLINIC | Age: 52
End: 2022-02-09

## 2022-02-09 DIAGNOSIS — R73.01 ELEVATED FASTING GLUCOSE: Primary | ICD-10-CM

## 2022-02-09 DIAGNOSIS — Z12.5 SCREENING FOR MALIGNANT NEOPLASM OF PROSTATE: ICD-10-CM

## 2022-02-09 DIAGNOSIS — J45.990 EXERCISE-INDUCED ASTHMA: ICD-10-CM

## 2022-02-09 DIAGNOSIS — E78.2 MIXED HYPERLIPIDEMIA: ICD-10-CM

## 2022-02-09 NOTE — TELEPHONE ENCOUNTER
----- Message from Don Yu sent at 2/9/2022  8:45 AM EST -----  Regarding: FW: My upcoming office visit 3/4/22  Michael Shah, I don't see current orders  They are from 2021  Please enter  Thanks  Mjs    ----- Message -----  From: Saumya Webster  Sent: 2/9/2022   7:35 AM EST  To: [facility]  Subject: My upcoming office visit 3/4/22                  Hello, is there anyway i could get all my blood work done before my office visit with you on March 4th? That way we can discuss the numbers and any directions you may have for me then  My schedule is very tough sometimes and i'm just trying to get everything done at one time  Please let me know    thank you

## 2022-02-18 ENCOUNTER — APPOINTMENT (OUTPATIENT)
Dept: LAB | Facility: CLINIC | Age: 52
End: 2022-02-18
Payer: COMMERCIAL

## 2022-02-18 LAB
ALBUMIN SERPL BCP-MCNC: 4.5 G/DL (ref 3.5–5)
ALP SERPL-CCNC: 68 U/L (ref 46–116)
ALT SERPL W P-5'-P-CCNC: 43 U/L (ref 12–78)
ANION GAP SERPL CALCULATED.3IONS-SCNC: 4 MMOL/L (ref 4–13)
AST SERPL W P-5'-P-CCNC: 28 U/L (ref 5–45)
BASOPHILS # BLD AUTO: 0.07 THOUSANDS/ΜL (ref 0–0.1)
BASOPHILS NFR BLD AUTO: 1 % (ref 0–1)
BILIRUB SERPL-MCNC: 0.63 MG/DL (ref 0.2–1)
BUN SERPL-MCNC: 20 MG/DL (ref 5–25)
CALCIUM SERPL-MCNC: 9.4 MG/DL (ref 8.3–10.1)
CHLORIDE SERPL-SCNC: 108 MMOL/L (ref 100–108)
CHOLEST SERPL-MCNC: 192 MG/DL
CO2 SERPL-SCNC: 29 MMOL/L (ref 21–32)
CREAT SERPL-MCNC: 1.02 MG/DL (ref 0.6–1.3)
EOSINOPHIL # BLD AUTO: 0.33 THOUSAND/ΜL (ref 0–0.61)
EOSINOPHIL NFR BLD AUTO: 6 % (ref 0–6)
ERYTHROCYTE [DISTWIDTH] IN BLOOD BY AUTOMATED COUNT: 12.5 % (ref 11.6–15.1)
EST. AVERAGE GLUCOSE BLD GHB EST-MCNC: 97 MG/DL
GFR SERPL CREATININE-BSD FRML MDRD: 84 ML/MIN/1.73SQ M
GLUCOSE P FAST SERPL-MCNC: 94 MG/DL (ref 65–99)
HBA1C MFR BLD: 5 %
HCT VFR BLD AUTO: 45.5 % (ref 36.5–49.3)
HDLC SERPL-MCNC: 54 MG/DL
HGB BLD-MCNC: 14.8 G/DL (ref 12–17)
IMM GRANULOCYTES # BLD AUTO: 0.02 THOUSAND/UL (ref 0–0.2)
IMM GRANULOCYTES NFR BLD AUTO: 0 % (ref 0–2)
LDLC SERPL CALC-MCNC: 120 MG/DL (ref 0–100)
LYMPHOCYTES # BLD AUTO: 1.85 THOUSANDS/ΜL (ref 0.6–4.47)
LYMPHOCYTES NFR BLD AUTO: 31 % (ref 14–44)
MCH RBC QN AUTO: 29.5 PG (ref 26.8–34.3)
MCHC RBC AUTO-ENTMCNC: 32.5 G/DL (ref 31.4–37.4)
MCV RBC AUTO: 91 FL (ref 82–98)
MONOCYTES # BLD AUTO: 0.53 THOUSAND/ΜL (ref 0.17–1.22)
MONOCYTES NFR BLD AUTO: 9 % (ref 4–12)
NEUTROPHILS # BLD AUTO: 3.19 THOUSANDS/ΜL (ref 1.85–7.62)
NEUTS SEG NFR BLD AUTO: 53 % (ref 43–75)
NRBC BLD AUTO-RTO: 0 /100 WBCS
PLATELET # BLD AUTO: 218 THOUSANDS/UL (ref 149–390)
PMV BLD AUTO: 10.8 FL (ref 8.9–12.7)
POTASSIUM SERPL-SCNC: 4.3 MMOL/L (ref 3.5–5.3)
PROT SERPL-MCNC: 7.7 G/DL (ref 6.4–8.2)
PSA SERPL-MCNC: 1.3 NG/ML (ref 0–4)
RBC # BLD AUTO: 5.01 MILLION/UL (ref 3.88–5.62)
SODIUM SERPL-SCNC: 141 MMOL/L (ref 136–145)
TRIGL SERPL-MCNC: 89 MG/DL
TSH SERPL DL<=0.05 MIU/L-ACNC: 2.59 UIU/ML (ref 0.36–3.74)
WBC # BLD AUTO: 5.99 THOUSAND/UL (ref 4.31–10.16)

## 2022-02-18 PROCEDURE — 85025 COMPLETE CBC W/AUTO DIFF WBC: CPT | Performed by: PHYSICIAN ASSISTANT

## 2022-02-18 PROCEDURE — 80061 LIPID PANEL: CPT | Performed by: PHYSICIAN ASSISTANT

## 2022-02-18 PROCEDURE — 80053 COMPREHEN METABOLIC PANEL: CPT | Performed by: PHYSICIAN ASSISTANT

## 2022-02-18 PROCEDURE — 36415 COLL VENOUS BLD VENIPUNCTURE: CPT | Performed by: PHYSICIAN ASSISTANT

## 2022-02-18 PROCEDURE — 83036 HEMOGLOBIN GLYCOSYLATED A1C: CPT | Performed by: PHYSICIAN ASSISTANT

## 2022-02-18 PROCEDURE — G0103 PSA SCREENING: HCPCS | Performed by: PHYSICIAN ASSISTANT

## 2022-02-18 PROCEDURE — 84443 ASSAY THYROID STIM HORMONE: CPT | Performed by: PHYSICIAN ASSISTANT

## 2022-03-04 ENCOUNTER — OFFICE VISIT (OUTPATIENT)
Dept: FAMILY MEDICINE CLINIC | Facility: CLINIC | Age: 52
End: 2022-03-04
Payer: COMMERCIAL

## 2022-03-04 VITALS
HEIGHT: 67 IN | OXYGEN SATURATION: 97 % | BODY MASS INDEX: 30.64 KG/M2 | DIASTOLIC BLOOD PRESSURE: 80 MMHG | HEART RATE: 70 BPM | WEIGHT: 195.2 LBS | SYSTOLIC BLOOD PRESSURE: 118 MMHG

## 2022-03-04 DIAGNOSIS — J30.1 SEASONAL ALLERGIC RHINITIS DUE TO POLLEN: ICD-10-CM

## 2022-03-04 DIAGNOSIS — R07.89 CHEST TIGHTNESS: ICD-10-CM

## 2022-03-04 DIAGNOSIS — Z82.49 FH: CAD (CORONARY ARTERY DISEASE): ICD-10-CM

## 2022-03-04 DIAGNOSIS — E78.2 MIXED HYPERLIPIDEMIA: Primary | ICD-10-CM

## 2022-03-04 DIAGNOSIS — Z00.00 ROUTINE GENERAL MEDICAL EXAMINATION AT A HEALTH CARE FACILITY: ICD-10-CM

## 2022-03-04 DIAGNOSIS — J45.990 EXERCISE-INDUCED ASTHMA: ICD-10-CM

## 2022-03-04 PROCEDURE — 1036F TOBACCO NON-USER: CPT | Performed by: PHYSICIAN ASSISTANT

## 2022-03-04 PROCEDURE — 99214 OFFICE O/P EST MOD 30 MIN: CPT | Performed by: PHYSICIAN ASSISTANT

## 2022-03-04 PROCEDURE — 3725F SCREEN DEPRESSION PERFORMED: CPT | Performed by: PHYSICIAN ASSISTANT

## 2022-03-04 PROCEDURE — 3008F BODY MASS INDEX DOCD: CPT | Performed by: PHYSICIAN ASSISTANT

## 2022-03-04 RX ORDER — MONTELUKAST SODIUM 10 MG/1
10 TABLET ORAL
Qty: 30 TABLET | Refills: 5 | Status: SHIPPED | OUTPATIENT
Start: 2022-03-04

## 2022-03-04 NOTE — PROGRESS NOTES
Assessment and Plan:  Patient Instructions     Assessment/plan:  1  Asthma  -Moderate persistent -not at goal   We discussed treatment options and he seems to have a significant allergic component to his symptoms  He will be started on Singulair 10 mg daily and continue Advair HFA twice daily  If he is still not at goal consider increasing dosage of Advair next  2  Hyperlipidemia  - LDL cholesterol 120 on simvastatin 40 mg daily  We discussed treatment options and he will continue healthy diet and exercise with this  Reassess in 6 months  If it is not improving consider switching to Crestor  3  Allergic rhinitis -patient does use Claritin as needed for symptoms  He will be started on Singulair which he may use in addition to his Claritin  Hopefully this will help further but if he is still having trouble consider further allergy testing with specialist    4 Colon polyps- patient is due for next colonoscopy in October of 2023   5   Family history of coronary artery disease -given his family history and occasional chest tightness with his asthma symptoms I would recommend stress test   Patient verbalizes understanding and agreement with plan  Problem List Items Addressed This Visit        Respiratory    Exercise-induced asthma    Relevant Medications    montelukast (SINGULAIR) 10 mg tablet       Other    Mixed hyperlipidemia - Primary    Relevant Orders    Lipid Panel with Direct LDL reflex    Comprehensive metabolic panel      Other Visit Diagnoses     Seasonal allergic rhinitis due to pollen        Relevant Medications    montelukast (SINGULAIR) 10 mg tablet    Chest tightness        Relevant Orders    Stress test only, exercise    FH: CAD (coronary artery disease)        Relevant Orders    Stress test only, exercise                 Diagnoses and all orders for this visit:    Mixed hyperlipidemia  -     Lipid Panel with Direct LDL reflex;  Future  -     Comprehensive metabolic panel; Future    Exercise-induced asthma  -     montelukast (SINGULAIR) 10 mg tablet; Take 1 tablet (10 mg total) by mouth daily at bedtime    Seasonal allergic rhinitis due to pollen  -     montelukast (SINGULAIR) 10 mg tablet; Take 1 tablet (10 mg total) by mouth daily at bedtime    Chest tightness  -     Stress test only, exercise; Future    FH: CAD (coronary artery disease)  -     Stress test only, exercise; Future              Subjective:      Patient ID: Tangela Roman is a 45 y o  male  CC:    Chief Complaint   Patient presents with    Follow-up     Pt here to review his Asthma  Pt states he is not at goal  He is using his Inhaler more often and he took some old prednisone and felt better after 2 days but he knows this is not a long term fix  kw    Asthma     Feels like he does not get a good breath all the time  Not just with exercise  kw    Results       HPI:     HPI:  This is a 50-year-old gentleman that presents to the office for follow-up of chronic health conditions including asthma, allergies, and hyperlipidemia  He is concerned with his asthma symptoms and feels that they have not been very well controlled especially in the fall season  He  always notices a increase in his symptoms in the fall and has been using his rescue inhaler 2 or 3 times daily at times  He has also had recent blood work which she is here to review  He does continue simvastatin for his cholesterol and LDL is up to 120  He is concerned about his weight  He does seem to fluctuate between 180 and 195 lb and is currently on the higher end of where he likes to be  The following portions of the patient's history were reviewed and updated as appropriate: allergies, current medications, past family history, past medical history, past social history, past surgical history and problem list       Review of Systems   Constitutional: Negative for chills, fatigue and fever  HENT: Negative for congestion, ear pain and sinus pressure  Eyes: Negative for visual disturbance  Respiratory: Negative for cough, chest tightness and shortness of breath  Cardiovascular: Negative for chest pain and palpitations  Gastrointestinal: Negative for diarrhea, nausea and vomiting  Endocrine: Negative for polyuria  Genitourinary: Negative for dysuria and frequency  Musculoskeletal: Negative for arthralgias and myalgias  Skin: Negative for pallor and rash  Neurological: Negative for dizziness, weakness, light-headedness, numbness and headaches  Psychiatric/Behavioral: Negative for agitation, behavioral problems and sleep disturbance  All other systems reviewed and are negative  Data to review:       Objective:    Vitals:    03/04/22 0804   BP: 118/80   BP Location: Right arm   Patient Position: Sitting   Pulse: 70   SpO2: 97%   Weight: 88 5 kg (195 lb 3 2 oz)   Height: 5' 7" (1 702 m)        Physical Exam  Constitutional:       General: He is not in acute distress  Appearance: He is well-developed  HENT:      Head: Normocephalic and atraumatic  Right Ear: Tympanic membrane normal       Left Ear: Tympanic membrane normal    Eyes:      Conjunctiva/sclera: Conjunctivae normal    Cardiovascular:      Rate and Rhythm: Normal rate and regular rhythm  Pulmonary:      Effort: Pulmonary effort is normal    Abdominal:      General: Abdomen is flat  Bowel sounds are normal  There is no distension  Palpations: Abdomen is soft  There is no mass  Musculoskeletal:         General: Normal range of motion  Cervical back: Normal range of motion  Skin:     General: Skin is warm  Findings: No rash  Neurological:      Mental Status: He is alert and oriented to person, place, and time  Psychiatric:         Mood and Affect: Mood normal              Depression Screening and Follow-up Plan: Patient was screened for depression during today's encounter  They screened negative with a PHQ-2 score of 0  BMI Counseling:  Body mass index is 30 57 kg/m²  The BMI is above normal  Nutrition recommendations include reducing portion sizes

## 2022-03-04 NOTE — PATIENT INSTRUCTIONS
Assessment/plan:  1  Asthma  -Moderate persistent -not at goal   We discussed treatment options and he seems to have a significant allergic component to his symptoms  He will be started on Singulair 10 mg daily and continue Advair HFA twice daily  If he is still not at goal consider increasing dosage of Advair next  2  Hyperlipidemia  - LDL cholesterol 120 on simvastatin 40 mg daily  We discussed treatment options and he will continue healthy diet and exercise with this  Reassess in 6 months  If it is not improving consider switching to Crestor  3  Allergic rhinitis -patient does use Claritin as needed for symptoms  He will be started on Singulair which he may use in addition to his Claritin  Hopefully this will help further but if he is still having trouble consider further allergy testing with specialist    4 Colon polyps- patient is due for next colonoscopy in October of 2023   5   Family history of coronary artery disease -given his family history and occasional chest tightness with his asthma symptoms I would recommend stress test   Patient verbalizes understanding and agreement with plan

## 2022-05-05 ENCOUNTER — TELEPHONE (OUTPATIENT)
Dept: FAMILY MEDICINE CLINIC | Facility: CLINIC | Age: 52
End: 2022-05-05

## 2022-05-05 NOTE — TELEPHONE ENCOUNTER
Patient wanted to know if the stress test that was ordered could be preventive  His Mother and Father both had heart attacks and he is not having any symptoms  This way his insurance will cover  Also patient dropped off a bill for Blood work  That was given to Layla

## 2022-05-10 NOTE — TELEPHONE ENCOUNTER
I am not sure what the insurance requires of me  I have family history of coronary artery disease attached to the diagnosis and would consider this preventative

## 2022-05-24 ENCOUNTER — TELEPHONE (OUTPATIENT)
Dept: FAMILY MEDICINE CLINIC | Facility: CLINIC | Age: 52
End: 2022-05-24

## 2022-05-24 NOTE — TELEPHONE ENCOUNTER
I am not sure how to relink these lab orders to Healthcare maintenance diagnosis to see if they can be resubmitted

## 2022-06-10 NOTE — TELEPHONE ENCOUNTER
Lab order has been updated and sent to billing for claim correction  Can you take a look at 3/4/22 visit? He states he was here for physical but I see problems were discussed    Please let me know if should be a physical

## 2022-06-15 NOTE — TELEPHONE ENCOUNTER
It looks to me like he was having asthma issues and we discussed treatment options  His cholesterol was not at goal and we discussed treatment options for that  This was a more problem oriented visit

## 2022-08-14 DIAGNOSIS — J45.990 EXERCISE-INDUCED ASTHMA: ICD-10-CM

## 2022-08-15 RX ORDER — ALBUTEROL SULFATE 90 UG/1
AEROSOL, METERED RESPIRATORY (INHALATION)
Qty: 25.5 G | Refills: 3 | Status: SHIPPED | OUTPATIENT
Start: 2022-08-15

## 2022-09-22 ENCOUNTER — TELEMEDICINE (OUTPATIENT)
Dept: FAMILY MEDICINE CLINIC | Facility: CLINIC | Age: 52
End: 2022-09-22
Payer: COMMERCIAL

## 2022-09-22 DIAGNOSIS — J45.990 EXERCISE-INDUCED ASTHMA: ICD-10-CM

## 2022-09-22 DIAGNOSIS — U07.1 COVID-19 VIRUS INFECTION: Primary | ICD-10-CM

## 2022-09-22 PROCEDURE — 99214 OFFICE O/P EST MOD 30 MIN: CPT | Performed by: NURSE PRACTITIONER

## 2022-09-22 RX ORDER — METHYLPREDNISOLONE 4 MG/1
TABLET ORAL
Qty: 21 EACH | Refills: 0 | Status: SHIPPED | OUTPATIENT
Start: 2022-09-22 | End: 2022-10-27 | Stop reason: SDUPTHER

## 2022-09-22 NOTE — PATIENT INSTRUCTIONS
Problem List Items Addressed This Visit          Respiratory    Exercise-induced asthma     Well controlled with as needed use of albuterol inhaler  Other    COVID-19 virus infection - Primary     Medrol Dosepak ordered to help with sore throat  Patient was also advised to use salt water gargles and Cepacol as needed  Patient was advised that since it has been over 5 days since his symptoms began and he is currently afebrile he may return from isolation at this point and begin to mask for 5 days when in public and when around others  He was advised to make the office aware if he begins to experience any new or worsening symptoms  Relevant Medications    methylPREDNISolone 4 MG tablet therapy pack           [address]    Your healthcare provider and/or public health staff have evaluated you and have determined that you do not need to remain in the hospital at this time  At this time you can be isolated at home where you will be monitored by staff from your local or state health department  You should carefully follow the prevention and isolation steps below until a healthcare provider or local or state health department says that you can return to your normal activities  Stay home except to get medical care    People who are mildly ill with COVID-19 are able to isolate at home during their illness  You should restrict activities outside your home, except for getting medical care  Do not go to work, school, or public areas  Avoid using public transportation, ride-sharing, or taxis  Separate yourself from other people and animals in your home    People: As much as possible, you should stay in a specific room and away from other people in your home  Also, you should use a separate bathroom, if available  Animals: You should restrict contact with pets and other animals while you are sick with COVID-19, just like you would around other people   Although there have not been reports of pets or other animals becoming sick with COVID-19, it is still recommended that people sick with COVID-19 limit contact with animals until more information is known about the virus  When possible, have another member of your household care for your animals while you are sick  If you are sick with COVID-19, avoid contact with your pet, including petting, snuggling, being kissed or licked, and sharing food  If you must care for your pet or be around animals while you are sick, wash your hands before and after you interact with pets and wear a facemask  See COVID-19 and Animals for more information  Call ahead before visiting your doctor    If you have a medical appointment, call the healthcare provider and tell them that you have or may have COVID-19  This will help the healthcare providers office take steps to keep other people from getting infected or exposed  Wear a facemask    You should wear a facemask when you are around other people (e g , sharing a room or vehicle) or pets and before you enter a healthcare providers office  If you are not able to wear a facemask (for example, because it causes trouble breathing), then people who live with you should not stay in the same room with you, or they should wear a facemask if they enter your room  Cover your coughs and sneezes    Cover your mouth and nose with a tissue when you cough or sneeze  Throw used tissues in a lined trash can  Immediately wash your hands with soap and water for at least 20 seconds or, if soap and water are not available, clean your hands with an alcohol-based hand  that contains at least 60% alcohol  Clean your hands often    Wash your hands often with soap and water for at least 20 seconds, especially after blowing your nose, coughing, or sneezing; going to the bathroom; and before eating or preparing food   If soap and water are not readily available, use an alcohol-based hand  with at least 60% alcohol, covering all surfaces of your hands and rubbing them together until they feel dry  Soap and water are the best option if hands are visibly dirty  Avoid touching your eyes, nose, and mouth with unwashed hands  Avoid sharing personal household items    You should not share dishes, drinking glasses, cups, eating utensils, towels, or bedding with other people or pets in your home  After using these items, they should be washed thoroughly with soap and water  Clean all high-touch surfaces everyday    High touch surfaces include counters, tabletops, doorknobs, bathroom fixtures, toilets, phones, keyboards, tablets, and bedside tables  Also, clean any surfaces that may have blood, stool, or body fluids on them  Use a household cleaning spray or wipe, according to the label instructions  Labels contain instructions for safe and effective use of the cleaning product including precautions you should take when applying the product, such as wearing gloves and making sure you have good ventilation during use of the product  Monitor your symptoms    Seek prompt medical attention if your illness is worsening (e g , difficulty breathing)  Before seeking care, call your healthcare provider and tell them that you have, or are being evaluated for, COVID-19  Put on a facemask before you enter the facility  These steps will help the healthcare providers office to keep other people in the office or waiting room from getting infected or exposed  Ask your healthcare provider to call the local or [facility] health department  Persons who are placed under active monitoring or facilitated self-monitoring should follow instructions provided by their local health department or occupational health professionals, as appropriate  If you have a medical emergency and need to call 911, notify the dispatch personnel that you have, or are being evaluated for COVID-19   If possible, put on a facemask before emergency medical services arrive  Discontinuing home isolation    Patients with confirmed COVID-19 should remain under home isolation precautions until the following conditions are met: They have had no fever for at least 24 hours (that is one full day of no fever without the use medicine that reduces fevers)  AND  other symptoms have improved (for example, when their cough or shortness of breath have improved)  AND  If had mild or moderate illness, at least 10 days have passed since their symptoms first appeared or if severe illness (needed oxygen) or immunosuppressed, at least 20 days have passed since symptoms first appeared  Patients with confirmed COVID-19 should also notify close contacts (including their workplace) and ask that they self-quarantine  Currently, close contact is defined as being within 6 feet for 15 minutes or more from the period 24 hours starting 48 hours before symptom onset to the time at which the patient went into isolation  Close contacts of patients diagnosed with COVID-19 should be instructed by the patient to self-quarantine for 14 days from the last time of their last contact with the patient       Source: [employer]

## 2022-09-22 NOTE — PROGRESS NOTES
COVID-19 Outpatient Progress Note    Assessment/Plan:    Problem List Items Addressed This Visit        Respiratory    Exercise-induced asthma     Well controlled with as needed use of albuterol inhaler  Other    COVID-19 virus infection - Primary     Medrol Dosepak ordered to help with sore throat  Patient was also advised to use salt water gargles and Cepacol as needed  Patient was advised that since it has been over 5 days since his symptoms began and he is currently afebrile he may return from isolation at this point and begin to mask for 5 days when in public and when around others  He was advised to make the office aware if he begins to experience any new or worsening symptoms  Relevant Medications    methylPREDNISolone 4 MG tablet therapy pack         Disposition:     Patient already tested positive for COVID  I have spent 15 minutes directly with the patient  Encounter provider: TANYA James     Provider located at: [address] [address] [phone]     Recent Visits  No visits were found meeting these conditions  Showing recent visits within past 7 days and meeting all other requirements  Today's Visits  Date Type Provider Dept   09/22/22 Telemedicine Jose [facility]   Showing today's visits and meeting all other requirements  Future Appointments  No visits were found meeting these conditions  Showing future appointments within next 150 days and meeting all other requirements     This virtual check-in was done via [employer] and patient was informed that this is a secure, HIPAA-compliant platform  He agrees to proceed  Patient agrees to participate in a virtual check in via telephone or video visit instead of presenting to the office to address urgent/immediate medical needs  Patient is aware this is a billable service   He acknowledged consent and understanding of privacy and security of the video platform  The patient has agreed to participate and understands they can discontinue the visit at any time  After connecting through [facility], the patient was identified by name and date of birth  Debbie Stevens was informed that this was a telemedicine visit and that the exam was being conducted confidentially over secure lines  My office door was closed  No one else was in the room  Debbie Stevens acknowledged consent and understanding of privacy and security of the telemedicine visit  I informed the patient that I have reviewed his record in Epic and presented the opportunity for him to ask any questions regarding the visit today  The patient agreed to participate  Verification of patient location:  Patient is located in the following state in which I hold an active license: [place]    Subjective:   Debbie Stevens is a 47 y o  male who is concerned about COVID-19  Patient's symptoms include fatigue, malaise, sore throat and cough (productive )  Patient denies fever, chills, congestion, rhinorrhea, anosmia, loss of taste, shortness of breath, chest tightness, abdominal pain, nausea, vomiting, diarrhea, myalgias and headaches       - Date of symptom onset: 9/17/2022      COVID-19 vaccination status: Fully vaccinated with booster    Exposure:   Contact with a person who is under investigation (PUI) for or who is positive for COVID-19 within the last 14 days?: No    Hospitalized recently for fever and/or lower respiratory symptoms?: No      Currently a healthcare worker that is involved in direct patient care?: No      Works in a special setting where the risk of COVID-19 transmission may be high? (this may include long-term care, correctional and longterm facilities; homeless shelters; assisted-living facilities and group homes ): No      Resident in a special setting where the risk of COVID-19 transmission may be high? (this may include long-term care, correctional and intermediate facilities; homeless shelters; assisted-living facilities and group homes ): No      Patient tested positive for COVID on 9/19/22 via home test   Patient is reporting symptoms of dysphagia, decreased appetite, intermittent productive cough, sore throat, and increased fatigue  Patient denies any fevers or shortness of breath  Since patient has been having symptoms for over 5 days at this point he does not qualify for oral COVID medications  Asthma:  Patient currently denies any increased shortness of breath or dyspnea on exertion  He denies needing to use his albuterol inhaler since his symptoms began  Patient reports that he is currently not using the Advair inhaler that is listed on his med list     No results found for: Basilio , [address] [facility], [facility], [facility], [facility]    Review of Systems   Constitutional: Positive for fatigue  Negative for chills and fever  HENT: Positive for sore throat and trouble swallowing (pain with swallowing)  Negative for congestion and rhinorrhea  Eyes: Negative  Respiratory: Positive for cough (productive )  Negative for chest tightness and shortness of breath  Cardiovascular: Negative  Gastrointestinal: Negative for abdominal pain, diarrhea, nausea and vomiting  Endocrine: Negative  Genitourinary: Negative  Musculoskeletal: Negative for myalgias  Skin: Negative  Allergic/Immunologic: Negative  Neurological: Negative for headaches  Hematological: Negative  Psychiatric/Behavioral: Negative  Current Outpatient Medications on File Prior to Visit   Medication Sig    albuterol (PROVENTIL HFA,VENTOLIN HFA) 90 mcg/act inhaler USE 2 INHALATIONS EVERY 6 HOURS AS NEEDED FOR WHEEZING    fluticasone-salmeterol (Advair HFA) 45-21 MCG/ACT inhaler Inhale 2 puffs 2 (two) times a day Rinse mouth after use      montelukast (SINGULAIR) 10 mg tablet Take 1 tablet (10 mg total) by mouth daily at bedtime    multivitamin [facility]) TABS Take 1 tablet by mouth daily    Probiotic Product (PROBIOTIC-10 PO) Take by mouth    simvastatin (ZOCOR) 40 mg tablet TAKE 1 TABLET DAILY       Objective: There were no vitals taken for this visit  Physical Exam  Vitals reviewed: limited due to [employer] exam    Constitutional:       General: He is not in acute distress  Appearance: Normal appearance  He is not ill-appearing  Neurological:      Mental Status: He is alert         Alveria Lighter, CRNP

## 2022-09-22 NOTE — ASSESSMENT & PLAN NOTE
Medrol Dosepak ordered to help with sore throat  Patient was also advised to use salt water gargles and Cepacol as needed  Patient was advised that since it has been over 5 days since his symptoms began and he is currently afebrile he may return from isolation at this point and begin to mask for 5 days when in public and when around others  He was advised to make the office aware if he begins to experience any new or worsening symptoms

## 2022-10-21 DIAGNOSIS — J45.990 EXERCISE-INDUCED ASTHMA: ICD-10-CM

## 2022-10-21 RX ORDER — FLUTICASONE PROPIONATE AND SALMETEROL XINAFOATE 45; 21 UG/1; UG/1
2 AEROSOL, METERED RESPIRATORY (INHALATION) 2 TIMES DAILY
Qty: 12 G | Refills: 3 | Status: SHIPPED | OUTPATIENT
Start: 2022-10-21

## 2022-10-27 ENCOUNTER — TELEPHONE (OUTPATIENT)
Dept: FAMILY MEDICINE CLINIC | Facility: CLINIC | Age: 52
End: 2022-10-27

## 2022-10-27 DIAGNOSIS — U07.1 COVID-19 VIRUS INFECTION: ICD-10-CM

## 2022-10-27 RX ORDER — METHYLPREDNISOLONE 4 MG/1
TABLET ORAL
Qty: 21 EACH | Refills: 0 | Status: SHIPPED | OUTPATIENT
Start: 2022-10-27

## 2022-10-27 NOTE — TELEPHONE ENCOUNTER
Patient is struggling with his asthma right now and is asking for the short term steroid prescription to knock it out of him  Patient states it got his lungs back to normal  He feels as though his inhalers are not working and he is doing his inhalers way too much to control his asthma  This happens this time of year with his lungs  Patient sees Sunny Mittal  Patient asks to please advise asap as he is going away for the weekend  Thank you 
Please advise
Will send in a Medrol Dosepak    Please follow up if symptoms persist 
negative

## 2022-11-23 ENCOUNTER — TELEPHONE (OUTPATIENT)
Dept: FAMILY MEDICINE CLINIC | Facility: CLINIC | Age: 52
End: 2022-11-23

## 2022-11-23 DIAGNOSIS — J45.990 EXERCISE-INDUCED ASTHMA: ICD-10-CM

## 2022-11-23 RX ORDER — FLUTICASONE PROPIONATE AND SALMETEROL XINAFOATE 45; 21 UG/1; UG/1
2 AEROSOL, METERED RESPIRATORY (INHALATION) 2 TIMES DAILY
Qty: 12 G | Refills: 3 | Status: SHIPPED | OUTPATIENT
Start: 2022-11-23 | End: 2022-11-25 | Stop reason: SDUPTHER

## 2022-11-25 DIAGNOSIS — J45.990 EXERCISE-INDUCED ASTHMA: ICD-10-CM

## 2022-11-25 RX ORDER — FLUTICASONE PROPIONATE AND SALMETEROL XINAFOATE 45; 21 UG/1; UG/1
2 AEROSOL, METERED RESPIRATORY (INHALATION) 2 TIMES DAILY
Qty: 48 G | Refills: 1 | Status: SHIPPED | OUTPATIENT
Start: 2022-11-25 | End: 2022-11-29 | Stop reason: SDUPTHER

## 2022-12-08 ENCOUNTER — HOSPITAL ENCOUNTER (OUTPATIENT)
Dept: NON INVASIVE DIAGNOSTICS | Facility: HOSPITAL | Age: 52
Discharge: HOME | End: 2022-12-08

## 2022-12-08 VITALS — BODY MASS INDEX: 30.61 KG/M2 | HEIGHT: 67 IN | WEIGHT: 195 LBS

## 2022-12-08 DIAGNOSIS — R07.89 CHEST TIGHTNESS: ICD-10-CM

## 2022-12-08 DIAGNOSIS — Z82.49 FH: CAD (CORONARY ARTERY DISEASE): ICD-10-CM

## 2022-12-08 LAB
BASELINE ST DEPRESSION: 0 MM
MAX HR PERCENT: 166 %
MAX HR: 157 BPM
RATE PRESSURE PRODUCT: NORMAL
SL CV STRESS RECOVERY BP: NORMAL MMHG
SL CV STRESS RECOVERY HR: 98 BPM
SL CV STRESS RECOVERY O2 SAT: 97 %
SL CV STRESS STAGE REACHED: 4
STRESS ANGINA INDEX: 0
STRESS BASELINE BP: NORMAL MMHG
STRESS BASELINE HR: 67 BPM
STRESS DUKE TREADMILL SCORE: 10
STRESS O2 SAT REST: 97 %
STRESS PEAK HR: 157 BPM
STRESS POST ESTIMATED WORKLOAD: 11.7 METS
STRESS POST EXERCISE DUR MIN: 10 MIN
STRESS POST O2 SAT PEAK: 97 %
STRESS POST PEAK BP: 166 MMHG
STRESS ST DEPRESSION: 0 MM

## 2022-12-09 ENCOUNTER — TELEPHONE (OUTPATIENT)
Dept: FAMILY MEDICINE CLINIC | Facility: CLINIC | Age: 52
End: 2022-12-09

## 2022-12-09 LAB
ARRHY DURING EX: NORMAL
CHEST PAIN STATEMENT: NORMAL
MAX DIASTOLIC BP: 82 MMHG
MAX HEART RATE: 157 BPM
MAX PREDICTED HEART RATE: 167 BPM
MAX. SYSTOLIC BP: 166 MMHG
PROTOCOL NAME: NORMAL
TARGET HR FORMULA: NORMAL
TEST INDICATION: NORMAL
TIME IN EXERCISE PHASE: NORMAL

## 2023-06-23 ENCOUNTER — RA CDI HCC (OUTPATIENT)
Dept: OTHER | Facility: HOSPITAL | Age: 53
End: 2023-06-23

## 2023-06-23 NOTE — PROGRESS NOTES
[facility]  coding opportunities       Chart reviewed, no opportunity found: CHART REVIEWED, NO OPPORTUNITY FOUND        Patients Insurance        Commercial Insurance: [place]

## 2023-07-16 LAB
ALBUMIN SERPL-MCNC: 4.6 G/DL (ref 3.6–5.1)
ALBUMIN/GLOB SERPL: 1.8 (CALC) (ref 1–2.5)
ALP SERPL-CCNC: 63 U/L (ref 35–144)
ALT SERPL-CCNC: 28 U/L (ref 9–46)
AST SERPL-CCNC: 25 U/L (ref 10–35)
BILIRUB SERPL-MCNC: 0.7 MG/DL (ref 0.2–1.2)
BUN SERPL-MCNC: 17 MG/DL (ref 7–25)
BUN/CREAT SERPL: NORMAL (CALC) (ref 6–22)
CALCIUM SERPL-MCNC: 9.3 MG/DL (ref 8.6–10.3)
CHLORIDE SERPL-SCNC: 107 MMOL/L (ref 98–110)
CHOLEST SERPL-MCNC: 197 MG/DL
CHOLEST/HDLC SERPL: 3.4 (CALC)
CO2 SERPL-SCNC: 23 MMOL/L (ref 20–32)
CREAT SERPL-MCNC: 0.89 MG/DL (ref 0.7–1.3)
GFR/BSA.PRED SERPLBLD CYS-BASED-ARV: 102 ML/MIN/1.73M2
GLOBULIN SER CALC-MCNC: 2.6 G/DL (CALC) (ref 1.9–3.7)
GLUCOSE SERPL-MCNC: 96 MG/DL (ref 65–99)
HDLC SERPL-MCNC: 58 MG/DL
LDLC SERPL CALC-MCNC: 117 MG/DL (CALC)
NONHDLC SERPL-MCNC: 139 MG/DL (CALC)
POTASSIUM SERPL-SCNC: 5 MMOL/L (ref 3.5–5.3)
PROT SERPL-MCNC: 7.2 G/DL (ref 6.1–8.1)
SODIUM SERPL-SCNC: 144 MMOL/L (ref 135–146)
TRIGL SERPL-MCNC: 114 MG/DL

## 2023-07-21 ENCOUNTER — OFFICE VISIT (OUTPATIENT)
Dept: INTERNAL MEDICINE CLINIC | Facility: CLINIC | Age: 53
End: 2023-07-21
Payer: COMMERCIAL

## 2023-07-21 VITALS
WEIGHT: 184.4 LBS | DIASTOLIC BLOOD PRESSURE: 86 MMHG | OXYGEN SATURATION: 97 % | TEMPERATURE: 98 F | HEART RATE: 67 BPM | SYSTOLIC BLOOD PRESSURE: 132 MMHG | HEIGHT: 67 IN | BODY MASS INDEX: 28.94 KG/M2

## 2023-07-21 DIAGNOSIS — E78.2 MIXED HYPERLIPIDEMIA: ICD-10-CM

## 2023-07-21 DIAGNOSIS — J45.990 EXERCISE-INDUCED ASTHMA: ICD-10-CM

## 2023-07-21 DIAGNOSIS — Z12.5 SCREENING FOR PROSTATE CANCER: Primary | ICD-10-CM

## 2023-07-21 PROBLEM — IMO0002 CURRENT TEAR OF MEDIAL CARTILAGE OR MENISCUS OF KNEE: Status: RESOLVED | Noted: 2019-11-13 | Resolved: 2023-07-21

## 2023-07-21 PROCEDURE — 99214 OFFICE O/P EST MOD 30 MIN: CPT | Performed by: NURSE PRACTITIONER

## 2023-07-21 RX ORDER — SIMVASTATIN 40 MG
40 TABLET ORAL DAILY
Qty: 90 TABLET | Refills: 1 | Status: SHIPPED | OUTPATIENT
Start: 2023-07-21

## 2023-07-21 RX ORDER — ALBUTEROL SULFATE 90 UG/1
2 AEROSOL, METERED RESPIRATORY (INHALATION) EVERY 6 HOURS PRN
Qty: 25.5 G | Refills: 3 | Status: SHIPPED | OUTPATIENT
Start: 2023-07-21

## 2023-07-21 RX ORDER — FLUTICASONE PROPIONATE AND SALMETEROL XINAFOATE 45; 21 UG/1; UG/1
2 AEROSOL, METERED RESPIRATORY (INHALATION) 2 TIMES DAILY
Qty: 48 G | Refills: 1 | Status: SHIPPED | OUTPATIENT
Start: 2023-07-21

## 2023-07-21 NOTE — PROGRESS NOTES
Assessment/Plan:    Exercise-induced asthma  Well controlled with albuterol and Advair     Mixed hyperlipidemia  Well controlled on Simvastatin. Recommend healthy lifestyle choices for your cholesterol. Low fat/low cholesterol diet. Limit/avoid red meat. Eat more lean meat - chicken breast, ground turkey, fish. Exercise 30 mins at least 5 times a week as tolerated. BMI Counseling: Body mass index is 28.87 kg/m². The BMI is above normal. Nutrition recommendations include decreasing portion sizes, encouraging healthy choices of fruits and vegetables, decreasing fast food intake, consuming healthier snacks, limiting drinks that contain sugar, moderation in carbohydrate intake, increasing intake of lean protein, reducing intake of saturated and trans fat and reducing intake of cholesterol. Exercise recommendations include moderate physical activity 150 minutes/week and exercising 3-5 times per week. Rationale for BMI follow-up plan is due to patient being overweight or obese. Depression Screening and Follow-up Plan: Patient was screened for depression during today's encounter. They screened negative with a PHQ-2 score of 0. Diagnoses and all orders for this visit:    Screening for prostate cancer  -     PSA, Total Screen; Future    Mixed hyperlipidemia  -     Comprehensive metabolic panel; Future  -     CBC and differential  -     Lipid panel  -     simvastatin (ZOCOR) 40 mg tablet; Take 1 tablet (40 mg total) by mouth daily    Exercise-induced asthma  -     albuterol (PROVENTIL HFA,VENTOLIN HFA) 90 mcg/act inhaler; Inhale 2 puffs every 6 (six) hours as needed for wheezing  -     fluticasone-salmeterol (Advair HFA) 45-21 MCG/ACT inhaler; Inhale 2 puffs 2 (two) times a day Rinse mouth after use. Subjective:      Patient ID: He Scott is a 46 y.o. male. Patient presents today to establish care.      Had stress test and was normal     Exercise-induced asthma- well controlled with advair and PRN albuterol, worse with the change of season     Mixed hyperlipidemia- ASCVD 4.6%, controlled on Zocor, denies side effects with this medication     Tobacco abuse- denies   Alcohol abuse- socially   Illegal drug abuse- denies    Exercise- walks every day, gym a few days a week       The following portions of the patient's history were reviewed and updated as appropriate: allergies, current medications, past family history, past medical history, past social history, past surgical history and problem list.    Review of Systems   Constitutional: Negative for activity change, appetite change, chills, diaphoresis and fever. HENT: Negative for congestion, ear discharge, ear pain, postnasal drip, rhinorrhea, sinus pressure, sinus pain and sore throat. Eyes: Negative for pain, discharge, itching and visual disturbance. Respiratory: Negative for cough, chest tightness, shortness of breath and wheezing. Cardiovascular: Negative for chest pain, palpitations and leg swelling. Gastrointestinal: Negative for abdominal pain, constipation, diarrhea, nausea and vomiting. Endocrine: Negative for polydipsia, polyphagia and polyuria. Genitourinary: Negative for difficulty urinating, dysuria and urgency. Musculoskeletal: Negative for arthralgias, back pain and neck pain. Skin: Negative for rash and wound. Neurological: Negative for dizziness, weakness, numbness and headaches.          Past Medical History:   Diagnosis Date   • Colon polyp    • History of seasonal allergies          Current Outpatient Medications:   •  albuterol (PROVENTIL HFA,VENTOLIN HFA) 90 mcg/act inhaler, Inhale 2 puffs every 6 (six) hours as needed for wheezing, Disp: 25.5 g, Rfl: 3  •  fluticasone-salmeterol (Advair HFA) 45-21 MCG/ACT inhaler, Inhale 2 puffs 2 (two) times a day Rinse mouth after use., Disp: 48 g, Rfl: 1  •  multivitamin (THERAGRAN) TABS, Take 1 tablet by mouth daily, Disp: , Rfl:   •  simvastatin (ZOCOR) 40 mg tablet, Take 1 tablet (40 mg total) by mouth daily, Disp: 90 tablet, Rfl: 1    No Known Allergies    Social History   Past Surgical History:   Procedure Laterality Date   • COLONOSCOPY     • KNEE SURGERY Right    • RETINAL DETACHMENT SURGERY Right    • TRICEPS TENDON RELEASE Bilateral    • WISDOM TOOTH EXTRACTION       Family History   Problem Relation Age of Onset   • Heart disease Mother    • Heart disease Father    • Heart disease Brother        Objective:  /86 (BP Location: Left arm, Patient Position: Sitting, Cuff Size: Standard)   Pulse 67   Temp 98 °F (36.7 °C) (Temporal)   Ht 5' 7.01" (1.702 m)   Wt 83.6 kg (184 lb 6.4 oz)   SpO2 97%   BMI 28.87 kg/m²     Recent Results (from the past 1344 hour(s))   Lipid Panel with Direct LDL reflex    Collection Time: 07/15/23  9:25 AM   Result Value Ref Range    Total Cholesterol 197 <200 mg/dL    HDL 58 > OR = 40 mg/dL    Triglycerides 114 <150 mg/dL    LDL Calculated 117 (H) mg/dL (calc)    Chol HDLC Ratio 3.4 <5.0 (calc)    Non-HDL Cholesterol 139 (H) <130 mg/dL (calc)   Comprehensive metabolic panel    Collection Time: 07/15/23  9:25 AM   Result Value Ref Range    Glucose, Random 96 65 - 99 mg/dL    BUN 17 7 - 25 mg/dL    Creatinine 0.89 0.70 - 1.30 mg/dL    eGFR 102 > OR = 60 mL/min/1.73m2    SL AMB BUN/CREATININE RATIO NOT APPLICABLE 6 - 22 (calc)    Sodium 144 135 - 146 mmol/L    Potassium 5.0 3.5 - 5.3 mmol/L    Chloride 107 98 - 110 mmol/L    CO2 23 20 - 32 mmol/L    Calcium 9.3 8.6 - 10.3 mg/dL    Protein, Total 7.2 6.1 - 8.1 g/dL    Albumin 4.6 3.6 - 5.1 g/dL    Globulin 2.6 1.9 - 3.7 g/dL (calc)    Albumin/Globulin Ratio 1.8 1.0 - 2.5 (calc)    TOTAL BILIRUBIN 0.7 0.2 - 1.2 mg/dL    Alkaline Phosphatase 63 35 - 144 U/L    AST 25 10 - 35 U/L    ALT 28 9 - 46 U/L            Physical Exam  Constitutional:       General: He is not in acute distress. Appearance: He is well-developed. He is not diaphoretic.    HENT:      Head: Normocephalic and atraumatic. Right Ear: External ear normal.      Left Ear: External ear normal.      Nose: Nose normal.      Mouth/Throat:      Pharynx: No oropharyngeal exudate. Eyes:      General:         Right eye: No discharge. Left eye: No discharge. Conjunctiva/sclera: Conjunctivae normal.      Pupils: Pupils are equal, round, and reactive to light. Neck:      Thyroid: No thyromegaly. Cardiovascular:      Rate and Rhythm: Normal rate and regular rhythm. Heart sounds: Normal heart sounds. No murmur heard. No friction rub. No gallop. Pulmonary:      Effort: Pulmonary effort is normal. No respiratory distress. Breath sounds: Normal breath sounds. No stridor. No wheezing or rales. Abdominal:      General: Bowel sounds are normal. There is no distension. Palpations: Abdomen is soft. Tenderness: There is no abdominal tenderness. Musculoskeletal:      Cervical back: Normal range of motion and neck supple. Lymphadenopathy:      Cervical: No cervical adenopathy. Skin:     General: Skin is warm and dry. Findings: No erythema or rash. Neurological:      Mental Status: He is alert and oriented to person, place, and time. Psychiatric:         Behavior: Behavior normal.         Thought Content:  Thought content normal.         Judgment: Judgment normal.

## 2023-07-21 NOTE — ASSESSMENT & PLAN NOTE
Well controlled on Simvastatin. Recommend healthy lifestyle choices for your cholesterol. Low fat/low cholesterol diet. Limit/avoid red meat. Eat more lean meat - chicken breast, ground turkey, fish. Exercise 30 mins at least 5 times a week as tolerated.

## 2023-12-01 DIAGNOSIS — J45.990 EXERCISE-INDUCED ASTHMA: ICD-10-CM

## 2023-12-01 RX ORDER — FLUTICASONE PROPIONATE AND SALMETEROL XINAFOATE 45; 21 UG/1; UG/1
2 AEROSOL, METERED RESPIRATORY (INHALATION) 2 TIMES DAILY
Qty: 48 G | Refills: 1 | Status: SHIPPED | OUTPATIENT
Start: 2023-12-01 | End: 2023-12-05 | Stop reason: SDUPTHER

## 2023-12-01 NOTE — TELEPHONE ENCOUNTER
Patient called the office needing a refill sent to [facility], [address] [place] [address],[address]     He states that when he tries and orders it from [facility] it shows one refill but he is un able to get that to go through so he was wondering if a new script had to be sent over either way he states he still needs a refill for this medication     NOV: 7/25/24

## 2023-12-05 DIAGNOSIS — J45.990 EXERCISE-INDUCED ASTHMA: ICD-10-CM

## 2023-12-05 RX ORDER — FLUTICASONE PROPIONATE AND SALMETEROL XINAFOATE 45; 21 UG/1; UG/1
2 AEROSOL, METERED RESPIRATORY (INHALATION) 2 TIMES DAILY
Qty: 48 G | Refills: 1 | Status: SHIPPED | OUTPATIENT
Start: 2023-12-05

## 2024-01-27 DIAGNOSIS — E78.2 MIXED HYPERLIPIDEMIA: ICD-10-CM

## 2024-01-29 RX ORDER — SIMVASTATIN 40 MG
40 TABLET ORAL DAILY
Qty: 90 TABLET | Refills: 1 | Status: SHIPPED | OUTPATIENT
Start: 2024-01-29

## 2024-02-21 PROBLEM — Z12.5 SCREENING FOR PROSTATE CANCER: Status: RESOLVED | Noted: 2019-11-13 | Resolved: 2024-02-21

## 2024-07-12 ENCOUNTER — RA CDI HCC (OUTPATIENT)
Dept: OTHER | Facility: HOSPITAL | Age: 54
End: 2024-07-12

## 2024-07-25 ENCOUNTER — TELEPHONE (OUTPATIENT)
Age: 54
End: 2024-07-25

## 2024-07-25 ENCOUNTER — OFFICE VISIT (OUTPATIENT)
Age: 54
End: 2024-07-25
Payer: COMMERCIAL

## 2024-07-25 VITALS
HEIGHT: 66 IN | BODY MASS INDEX: 29.83 KG/M2 | HEART RATE: 68 BPM | DIASTOLIC BLOOD PRESSURE: 82 MMHG | TEMPERATURE: 98.2 F | OXYGEN SATURATION: 97 % | WEIGHT: 185.6 LBS | SYSTOLIC BLOOD PRESSURE: 118 MMHG

## 2024-07-25 DIAGNOSIS — E78.5 HYPERLIPIDEMIA, UNSPECIFIED HYPERLIPIDEMIA TYPE: ICD-10-CM

## 2024-07-25 DIAGNOSIS — J45.990 EXERCISE-INDUCED ASTHMA: ICD-10-CM

## 2024-07-25 DIAGNOSIS — Z12.5 SCREENING FOR PROSTATE CANCER: ICD-10-CM

## 2024-07-25 DIAGNOSIS — Z13.228 SCREENING FOR METABOLIC DISORDER: Primary | ICD-10-CM

## 2024-07-25 DIAGNOSIS — Z00.00 ANNUAL PHYSICAL EXAM: ICD-10-CM

## 2024-07-25 DIAGNOSIS — E78.2 MIXED HYPERLIPIDEMIA: ICD-10-CM

## 2024-07-25 LAB
ALBUMIN SERPL-MCNC: 4.8 G/DL (ref 3.6–5.1)
ALBUMIN/GLOB SERPL: 2.1 (CALC) (ref 1–2.5)
ALP SERPL-CCNC: 61 U/L (ref 35–144)
ALT SERPL-CCNC: 24 U/L (ref 9–46)
AST SERPL-CCNC: 25 U/L (ref 10–35)
BILIRUB SERPL-MCNC: 0.7 MG/DL (ref 0.2–1.2)
BUN SERPL-MCNC: 22 MG/DL (ref 7–25)
BUN/CREAT SERPL: NORMAL (CALC) (ref 6–22)
CALCIUM SERPL-MCNC: 9.6 MG/DL (ref 8.6–10.3)
CHLORIDE SERPL-SCNC: 105 MMOL/L (ref 98–110)
CHOLEST SERPL-MCNC: 200 MG/DL
CHOLEST/HDLC SERPL: 3.5 (CALC)
CO2 SERPL-SCNC: 28 MMOL/L (ref 20–32)
CREAT SERPL-MCNC: 0.93 MG/DL (ref 0.7–1.3)
GFR/BSA.PRED SERPLBLD CYS-BASED-ARV: 97 ML/MIN/1.73M2
GLOBULIN SER CALC-MCNC: 2.3 G/DL (CALC) (ref 1.9–3.7)
GLUCOSE SERPL-MCNC: 96 MG/DL (ref 65–99)
HDLC SERPL-MCNC: 57 MG/DL
LDLC SERPL CALC-MCNC: 120 MG/DL (CALC)
NONHDLC SERPL-MCNC: 143 MG/DL (CALC)
POTASSIUM SERPL-SCNC: 4.4 MMOL/L (ref 3.5–5.3)
PROT SERPL-MCNC: 7.1 G/DL (ref 6.1–8.1)
PSA SERPL-MCNC: 1.06 NG/ML
REF LAB TEST NAME: NORMAL
REF LAB TEST: NORMAL
SL AMB CLIENT CONTACT: NORMAL
SODIUM SERPL-SCNC: 143 MMOL/L (ref 135–146)
TRIGL SERPL-MCNC: 122 MG/DL

## 2024-07-25 PROCEDURE — 99396 PREV VISIT EST AGE 40-64: CPT | Performed by: NURSE PRACTITIONER

## 2024-07-25 NOTE — PROGRESS NOTES
[facility] - [facility]  Well [facility]  Patient ID: William Marques    : 1970  Age/Gender: 54 y.o. male     DATE: 2024      Assessment/Plan:    Exercise-induced asthma  Well controlled with albuterol and Advair     Mixed hyperlipidemia  Well controlled on Simvastatin. Recommend healthy lifestyle choices for your cholesterol.  Low fat/low cholesterol diet.  Limit/avoid red meat.  Eat more lean meat - chicken breast, ground turkey, fish.  Exercise 30 mins at least 5 times a week as tolerated.        Annual physical exam  -Up-to-date with fasting blood work  -Continue with well-balanced diet, encouraged daily exercise  -Up-to-date with prostate and colon cancer screening  -Recommend Tdap and shingles vaccination patient defers  -Encourage monthly self testicular examination  -Follow-up in 1 year for annual physical or sooner if needed       Diagnoses and all orders for this visit:    Screening for metabolic disorder  -     Comprehensive metabolic panel; Future  -     CBC and differential  -     Lipid panel    Hyperlipidemia, unspecified hyperlipidemia type  -     Lipid panel    Screening for prostate cancer  -     PSA, Total Screen; Future    Annual physical exam  -     Comprehensive metabolic panel; Future  -     CBC and differential  -     Lipid panel  -     PSA, Total Screen; Future    Exercise-induced asthma    Mixed hyperlipidemia    BMI 30.0-30.9,adult          Subjective:     William Marques is a 54 y.o. male who presents to the office on 2024 for a health maintenance physical.    Patient presents today for annual physical.  Reviewed blood work while in office today.  Lipid panel added onto most recent blood work through [facility].    Had stress test and was normal     Exercise-induced asthma- well controlled with advair and PRN albuterol, worse with the change of season, not recent exacerbation     Mixed hyperlipidemia- ASCVD  4.2%, controlled on Zocor, denies side effects with this medication     Tobacco abuse- denies   Alcohol abuse- socially   Illegal drug abuse- denies    Exercise- walks every day, gym a few days a week       The following portions of the patient's history were reviewed and updated as appropriate: allergies, current medications, past family history, past medical history, past social history, past surgical history, and problem list.    Pt reports overall health:  good  Last Physical: last year    Healthy Diet: well balanced   Routine Exercise: walks everyday, gym 3 times a week  Weight Concerns:  denies    Problems with vision: denies new concerns, wears glasses  Last Eye Exam:  2024, yearly    Problems with Hearing: denies     Routine Dental Exams:  every 6 months    Smoking History:  denies  ETOH Use:  social  Illegal Drug Use:  denies  Caffeine Use:  2-3 12oz cups a day    Reproductive Health:    Sexually Active:  currently   Last PSA:  2024  Testicular self exam: monthly     Depression Screening:  PHQ-2/9 Depression Screening    Little interest or pleasure in doing things: 0 - not at all  Feeling down, depressed, or hopeless: 0 - not at all  PHQ-2 Score: 0  PHQ-2 Interpretation: Negative depression screen         Last Colonoscopy:  2024  History of abnormal Colonoscopy:  in the past, follow up     Last Labs: 2024    Review of Systems   Constitutional:  Negative for activity change, appetite change, chills, diaphoresis and fever.   HENT:  Negative for congestion, ear discharge, ear pain, postnasal drip, rhinorrhea, sinus pressure, sinus pain and sore throat.    Eyes:  Negative for pain, discharge, itching and visual disturbance.   Respiratory:  Negative for cough, chest tightness, shortness of breath and wheezing.    Cardiovascular:  Negative for chest pain, palpitations and leg swelling.   Gastrointestinal:  Negative for abdominal pain, blood in stool, constipation, diarrhea, nausea and vomiting.   Endocrine:  Negative for polydipsia, polyphagia and polyuria.   Genitourinary:  Negative for difficulty urinating, dysuria, hematuria and urgency.   Musculoskeletal:  Negative for arthralgias, back pain and neck pain.   Skin:  Negative for rash and wound.   Neurological:  Negative for dizziness, weakness, numbness and headaches.   Psychiatric/Behavioral:  Negative for sleep disturbance.          Patient Active Problem List   Diagnosis    Anxiety    Exercise-induced asthma    Chondromalacia patellae    Colonic mass    Eczema    Mixed hyperlipidemia    Iliotibial band syndrome    Knee pain    Proteinuria    Retinal detachment    Rupture of triceps tendon    Tear of lateral cartilage or meniscus of knee, current    Knee joint effusion    Osteoarthritis of knee    S/P colonoscopy    BMI 30.0-30.9,adult    Annual physical exam    COVID-19 virus infection       Past Medical History:   Diagnosis Date    Colon polyp     History of seasonal allergies        Past Surgical History:   Procedure Laterality Date    COLONOSCOPY      KNEE SURGERY Right     RETINAL DETACHMENT SURGERY Right     TRICEPS TENDON RELEASE Bilateral     WISDOM TOOTH EXTRACTION           Current Outpatient Medications:     albuterol (PROVENTIL HFA,VENTOLIN HFA) 90 mcg/act inhaler, Inhale 2 puffs every 6 (six) hours as needed for wheezing, Disp: 25.5 g, Rfl: 3    fluticasone-salmeterol (Advair HFA) 45-21 MCG/ACT inhaler, Inhale 2 puffs 2 (two) times a day Rinse mouth after use., Disp: 48 g, Rfl: 1    multivitamin (THERAGRAN) TABS, Take 1 tablet by mouth daily, Disp: , Rfl:     simvastatin (ZOCOR) 40 mg tablet, Take 1 tablet (40 mg total) by mouth daily, Disp: 90 tablet, Rfl: 1    No Known Allergies    Social History     Socioeconomic History    Marital status: /Civil Union     Spouse name: None    Number of children: None    Years of education: None    Highest education level: None   Occupational History    None   Tobacco Use    Smoking status: Never    Smokeless  tobacco: Never   Vaping Use    Vaping status: Never Used   Substance and Sexual Activity    Alcohol use: Yes     Comment: occasional    Drug use: Never    Sexual activity: None   Other Topics Concern    None   Social History Narrative    None     Social Determinants of Health     Financial Resource Strain: Not on file   Food Insecurity: Not on file   Transportation Needs: Not on file   Physical Activity: Not on file   Stress: Not on file   Social Connections: Not on file   Intimate Partner Violence: Not on file   Housing Stability: Not on file       Family History   Problem Relation Age of Onset    Heart disease Mother     Heart disease Father     Heart disease Brother        Immunization History   Administered Date(s) Administered    COVID-19 MODERNA VACC 0.5 ML IM 05/03/2021, 06/07/2021, 06/07/2021, 01/26/2022    Influenza, seasonal, injectable 11/10/2011    Rabies 05/20/2011, 05/20/2011, 05/20/2011, 05/31/2011    TD (adult) Preservative Free 01/01/1995    Td (adult), adsorbed 05/16/2011, 05/16/2011        Health Maintenance   Topic Date Due    Hepatitis C Screening  Never done    Pneumococcal Vaccine: Pediatrics (0 to 5 Years) and At-Risk Patients (6 to 64 Years) (1 of 2 - PCV) Never done    HIV Screening  Never done    DTaP,Tdap,and Td Vaccines (1 - Tdap) 05/17/2011    Zoster Vaccine (1 of 2) Never done    COVID-19 Vaccine (5 - 2023-24 season) 09/01/2023    Influenza Vaccine (1) 09/01/2024    Depression Screening  07/25/2025    Annual Physical  07/25/2025    Colorectal Cancer Screening  01/12/2027    RSV Vaccine Age 60+ Years (1 - 1-dose 60+ series) 04/07/2029    RSV Vaccine age 0-20 Months  Aged Out    HIB Vaccine  Aged Out    IPV Vaccine  Aged Out    Hepatitis A Vaccine  Aged Out    Meningococcal ACWY Vaccine  Aged Out    HPV Vaccine  Aged Out       Objective:  Vitals:    07/25/24 1001   BP: 118/82   BP Location: Left arm   Patient Position: Sitting   Cuff Size: Standard   Pulse: 68   Temp: 98.2 °F (36.8 °C)  "  TempSrc: Temporal   SpO2: 97%   Weight: 84.2 kg (185 lb 9.6 oz)   Height: 5' 6\" (1.676 m)     Wt Readings from Last 3 Encounters:   07/25/24 84.2 kg (185 lb 9.6 oz)   07/21/23 83.6 kg (184 lb 6.4 oz)   12/08/22 88.5 kg (195 lb)     Body mass index is 29.96 kg/m².  No results found.       Physical Exam  Constitutional:       General: He is not in acute distress.     Appearance: He is well-developed. He is not diaphoretic.   HENT:      Head: Normocephalic and atraumatic.      Right Ear: External ear normal.      Left Ear: External ear normal.      Nose: Nose normal.      Mouth/Throat:      Mouth: Mucous membranes are moist.      Pharynx: No oropharyngeal exudate or posterior oropharyngeal erythema.   Eyes:      General:         Right eye: No discharge.         Left eye: No discharge.      Conjunctiva/sclera: Conjunctivae normal.      Pupils: Pupils are equal, round, and reactive to light.   Neck:      Thyroid: No thyromegaly.   Cardiovascular:      Rate and Rhythm: Normal rate and regular rhythm.      Heart sounds: Normal heart sounds. No murmur heard.     No friction rub. No gallop.   Pulmonary:      Effort: Pulmonary effort is normal. No respiratory distress.      Breath sounds: Normal breath sounds. No stridor. No wheezing or rales.   Abdominal:      General: Bowel sounds are normal. There is no distension.      Palpations: Abdomen is soft.      Tenderness: There is no abdominal tenderness.   Musculoskeletal:      Cervical back: Normal range of motion and neck supple.   Lymphadenopathy:      Cervical: No cervical adenopathy.   Skin:     General: Skin is warm and dry.      Findings: No erythema or rash.   Neurological:      Mental Status: He is alert and oriented to person, place, and time.   Psychiatric:         Behavior: Behavior normal.         Thought Content: Thought content normal.         Judgment: Judgment normal.             Future Appointments   Date Time Provider Department Center   8/1/2025  9:00 AM " TANYA Pelaez [facility]       TANYA Pelaez  [facility], [facility] [place]    Patient Care Team:  TANYA Pelaez as PCP - General (Family Medicine)  Rafael Nguyen PA-C as PCP - PCP-[facility]  ANEL Waddell PA-C

## 2024-07-25 NOTE — TELEPHONE ENCOUNTER
Called [employer] @ phone number [phone].   Per Rebeca MARTÍNEZ patient had lab work done at [facility] location on 7/24/2024 provider wanted to add on  CBC and Lipid lab work to collected blood.     Called and spoke with Azalia was able to add lipid lab to recently collected blood but not a CBC as they did not collect a lavender tube.     Patient has been informed and stated if there is anything else that has to be completed sooner then today ordered labs to send him a message    [facility] stated they will call back is there is any issues with labs.     Thank you

## 2024-07-25 NOTE — ASSESSMENT & PLAN NOTE
-Up-to-date with fasting blood work  -Continue with well-balanced diet, encouraged daily exercise  -Up-to-date with prostate and colon cancer screening  -Recommend Tdap and shingles vaccination patient defers  -Encourage monthly self testicular examination  -Follow-up in 1 year for annual physical or sooner if needed

## 2024-07-26 ENCOUNTER — TELEPHONE (OUTPATIENT)
Age: 54
End: 2024-07-26

## 2024-07-29 DIAGNOSIS — J45.990 EXERCISE-INDUCED ASTHMA: ICD-10-CM

## 2024-07-29 RX ORDER — ALBUTEROL SULFATE 90 UG/1
2 AEROSOL, METERED RESPIRATORY (INHALATION) EVERY 6 HOURS PRN
Qty: 25.5 G | Refills: 3 | Status: CANCELLED | OUTPATIENT
Start: 2024-07-29

## 2024-07-30 DIAGNOSIS — E78.2 MIXED HYPERLIPIDEMIA: ICD-10-CM

## 2024-07-30 DIAGNOSIS — J45.990 EXERCISE-INDUCED ASTHMA: ICD-10-CM

## 2024-07-30 RX ORDER — ALBUTEROL SULFATE 90 UG/1
2 AEROSOL, METERED RESPIRATORY (INHALATION) EVERY 6 HOURS PRN
Qty: 25.5 G | Refills: 3 | Status: SHIPPED | OUTPATIENT
Start: 2024-07-30

## 2024-07-30 RX ORDER — SIMVASTATIN 40 MG
40 TABLET ORAL DAILY
Qty: 90 TABLET | Refills: 1 | Status: SHIPPED | OUTPATIENT
Start: 2024-07-30

## 2024-07-30 RX ORDER — FLUTICASONE PROPIONATE AND SALMETEROL XINAFOATE 45; 21 UG/1; UG/1
2 AEROSOL, METERED RESPIRATORY (INHALATION) 2 TIMES DAILY
Qty: 48 G | Refills: 1 | Status: SHIPPED | OUTPATIENT
Start: 2024-07-30

## 2025-01-26 DIAGNOSIS — E78.2 MIXED HYPERLIPIDEMIA: ICD-10-CM

## 2025-01-27 RX ORDER — SIMVASTATIN 40 MG
40 TABLET ORAL DAILY
Qty: 90 TABLET | Refills: 1 | Status: SHIPPED | OUTPATIENT
Start: 2025-01-27

## 2025-07-28 ENCOUNTER — PATIENT MESSAGE (OUTPATIENT)
Age: 55
End: 2025-07-28

## 2025-07-28 DIAGNOSIS — Z13.228 SCREENING FOR METABOLIC DISORDER: ICD-10-CM

## 2025-07-28 DIAGNOSIS — E78.2 MIXED HYPERLIPIDEMIA: ICD-10-CM

## 2025-07-28 DIAGNOSIS — Z12.5 SCREENING FOR PROSTATE CANCER: Primary | ICD-10-CM

## 2025-07-29 DIAGNOSIS — Z13.228 SCREENING FOR METABOLIC DISORDER: Primary | ICD-10-CM

## 2025-07-29 DIAGNOSIS — E78.2 MIXED HYPERLIPIDEMIA: ICD-10-CM

## 2025-07-29 DIAGNOSIS — Z12.5 SCREENING FOR PROSTATE CANCER: ICD-10-CM

## 2025-08-04 DIAGNOSIS — E78.2 MIXED HYPERLIPIDEMIA: ICD-10-CM

## 2025-08-05 RX ORDER — SIMVASTATIN 40 MG
40 TABLET ORAL DAILY
Qty: 14 TABLET | Refills: 0 | Status: SHIPPED | OUTPATIENT
Start: 2025-08-05 | End: 2025-08-06 | Stop reason: SDUPTHER

## 2025-08-06 DIAGNOSIS — E78.2 MIXED HYPERLIPIDEMIA: ICD-10-CM

## 2025-08-06 RX ORDER — SIMVASTATIN 40 MG
40 TABLET ORAL DAILY
Qty: 90 TABLET | Refills: 1 | Status: SHIPPED | OUTPATIENT
Start: 2025-08-06

## 2025-08-07 ENCOUNTER — APPOINTMENT (OUTPATIENT)
Dept: LAB | Facility: CLINIC | Age: 55
End: 2025-08-07
Payer: COMMERCIAL

## 2025-08-07 LAB
ALBUMIN SERPL BCG-MCNC: 4.4 G/DL (ref 3.5–5)
ALP SERPL-CCNC: 53 U/L (ref 34–104)
ALT SERPL W P-5'-P-CCNC: 29 U/L (ref 7–52)
ANION GAP SERPL CALCULATED.3IONS-SCNC: 10 MMOL/L (ref 4–13)
AST SERPL W P-5'-P-CCNC: 33 U/L (ref 13–39)
BASOPHILS # BLD AUTO: 0.06 THOUSANDS/ÂΜL (ref 0–0.1)
BASOPHILS NFR BLD AUTO: 1 % (ref 0–1)
BILIRUB SERPL-MCNC: 0.66 MG/DL (ref 0.2–1)
BUN SERPL-MCNC: 17 MG/DL (ref 5–25)
CALCIUM SERPL-MCNC: 9 MG/DL (ref 8.4–10.2)
CHLORIDE SERPL-SCNC: 105 MMOL/L (ref 96–108)
CHOLEST SERPL-MCNC: 175 MG/DL (ref ?–200)
CO2 SERPL-SCNC: 26 MMOL/L (ref 21–32)
CREAT SERPL-MCNC: 0.88 MG/DL (ref 0.6–1.3)
EOSINOPHIL # BLD AUTO: 0.22 THOUSAND/ÂΜL (ref 0–0.61)
EOSINOPHIL NFR BLD AUTO: 4 % (ref 0–6)
ERYTHROCYTE [DISTWIDTH] IN BLOOD BY AUTOMATED COUNT: 12.2 % (ref 11.6–15.1)
GFR SERPL CREATININE-BSD FRML MDRD: 96 ML/MIN/1.73SQ M
GLUCOSE P FAST SERPL-MCNC: 97 MG/DL (ref 65–99)
HCT VFR BLD AUTO: 42 % (ref 36.5–49.3)
HDLC SERPL-MCNC: 49 MG/DL
HGB BLD-MCNC: 14.2 G/DL (ref 12–17)
IMM GRANULOCYTES # BLD AUTO: 0.02 THOUSAND/UL (ref 0–0.2)
IMM GRANULOCYTES NFR BLD AUTO: 0 % (ref 0–2)
LDLC SERPL CALC-MCNC: 107 MG/DL (ref 0–100)
LYMPHOCYTES # BLD AUTO: 1.6 THOUSANDS/ÂΜL (ref 0.6–4.47)
LYMPHOCYTES NFR BLD AUTO: 31 % (ref 14–44)
MCH RBC QN AUTO: 30 PG (ref 26.8–34.3)
MCHC RBC AUTO-ENTMCNC: 33.8 G/DL (ref 31.4–37.4)
MCV RBC AUTO: 89 FL (ref 82–98)
MONOCYTES # BLD AUTO: 0.41 THOUSAND/ÂΜL (ref 0.17–1.22)
MONOCYTES NFR BLD AUTO: 8 % (ref 4–12)
NEUTROPHILS # BLD AUTO: 2.8 THOUSANDS/ÂΜL (ref 1.85–7.62)
NEUTS SEG NFR BLD AUTO: 56 % (ref 43–75)
NONHDLC SERPL-MCNC: 126 MG/DL
NRBC BLD AUTO-RTO: 0 /100 WBCS
PLATELET # BLD AUTO: 213 THOUSANDS/UL (ref 149–390)
PMV BLD AUTO: 10.4 FL (ref 8.9–12.7)
POTASSIUM SERPL-SCNC: 4.7 MMOL/L (ref 3.5–5.3)
PROT SERPL-MCNC: 6.8 G/DL (ref 6.4–8.4)
PSA SERPL-MCNC: 1.01 NG/ML (ref 0–4)
RBC # BLD AUTO: 4.74 MILLION/UL (ref 3.88–5.62)
SODIUM SERPL-SCNC: 141 MMOL/L (ref 135–147)
TRIGL SERPL-MCNC: 97 MG/DL (ref ?–150)
WBC # BLD AUTO: 5.11 THOUSAND/UL (ref 4.31–10.16)

## 2025-08-07 PROCEDURE — 36415 COLL VENOUS BLD VENIPUNCTURE: CPT | Performed by: NURSE PRACTITIONER

## 2025-08-07 PROCEDURE — G0103 PSA SCREENING: HCPCS | Performed by: NURSE PRACTITIONER

## 2025-08-07 PROCEDURE — 80053 COMPREHEN METABOLIC PANEL: CPT | Performed by: NURSE PRACTITIONER
